# Patient Record
Sex: FEMALE | Race: WHITE | Employment: UNEMPLOYED | URBAN - METROPOLITAN AREA
[De-identification: names, ages, dates, MRNs, and addresses within clinical notes are randomized per-mention and may not be internally consistent; named-entity substitution may affect disease eponyms.]

---

## 2018-10-29 ENCOUNTER — OFFICE VISIT (OUTPATIENT)
Dept: INTERNAL MEDICINE CLINIC | Facility: CLINIC | Age: 33
End: 2018-10-29
Payer: MEDICAID

## 2018-10-29 VITALS
RESPIRATION RATE: 17 BRPM | SYSTOLIC BLOOD PRESSURE: 128 MMHG | WEIGHT: 180.81 LBS | HEIGHT: 64 IN | OXYGEN SATURATION: 98 % | BODY MASS INDEX: 30.87 KG/M2 | DIASTOLIC BLOOD PRESSURE: 84 MMHG | TEMPERATURE: 99 F | HEART RATE: 103 BPM

## 2018-10-29 DIAGNOSIS — N62 LARGE BREASTS: ICD-10-CM

## 2018-10-29 DIAGNOSIS — M79.673 PAIN OF FOOT, UNSPECIFIED LATERALITY: ICD-10-CM

## 2018-10-29 DIAGNOSIS — M25.562 PAIN IN BOTH KNEES, UNSPECIFIED CHRONICITY: ICD-10-CM

## 2018-10-29 DIAGNOSIS — J45.909 BRONCHITIS WITH ASTHMA, ACUTE: Primary | ICD-10-CM

## 2018-10-29 DIAGNOSIS — M25.561 PAIN IN BOTH KNEES, UNSPECIFIED CHRONICITY: ICD-10-CM

## 2018-10-29 DIAGNOSIS — J20.9 BRONCHITIS WITH ASTHMA, ACUTE: Primary | ICD-10-CM

## 2018-10-29 PROCEDURE — 99203 OFFICE O/P NEW LOW 30 MIN: CPT | Performed by: INTERNAL MEDICINE

## 2018-10-29 RX ORDER — ERGOCALCIFEROL 1.25 MG/1
CAPSULE ORAL
Refills: 2 | COMMUNITY
Start: 2018-10-29 | End: 2019-05-10 | Stop reason: ALTCHOICE

## 2018-10-29 RX ORDER — MULTIVITAMIN
TABLET ORAL
COMMUNITY

## 2018-10-29 RX ORDER — CODEINE PHOSPHATE AND GUAIFENESIN 10; 100 MG/5ML; MG/5ML
5 SOLUTION ORAL EVERY 6 HOURS PRN
Qty: 118 ML | Refills: 0 | Status: SHIPPED | OUTPATIENT
Start: 2018-10-29 | End: 2019-05-10 | Stop reason: ALTCHOICE

## 2018-10-29 RX ORDER — METHYLPREDNISOLONE 4 MG/1
TABLET ORAL
Qty: 1 KIT | Refills: 0 | Status: SHIPPED | OUTPATIENT
Start: 2018-10-29 | End: 2019-05-10 | Stop reason: ALTCHOICE

## 2018-10-29 RX ORDER — AZITHROMYCIN 250 MG/1
TABLET, FILM COATED ORAL
Qty: 6 TABLET | Refills: 0 | Status: SHIPPED | OUTPATIENT
Start: 2018-10-29 | End: 2019-05-10 | Stop reason: ALTCHOICE

## 2018-10-29 RX ORDER — ALBUTEROL SULFATE 90 UG/1
2 AEROSOL, METERED RESPIRATORY (INHALATION) EVERY 6 HOURS PRN
Qty: 1 INHALER | Refills: 2 | Status: SHIPPED | OUTPATIENT
Start: 2018-10-29 | End: 2021-08-15

## 2018-10-29 NOTE — PROGRESS NOTES
Donnell Grider is a 29year old female.     Chief complaint:  URTI, asthma cough  HPI:     29year old female with PMH as listed below here for   URTI and asthma   Patient reports that she went apple picking over the weekend   When she came back started having SpO2 98%   BMI 31.03 kg/m²   GENERAL: well developed, well nourished,in no apparent distress  SKIN: no rashes,no suspicious lesions  HEENT: atraumatic, normocephalic,ears and throat are clear  NECK: supple,no adenopathy  LUNGS: coarse breathing sound bilat

## 2018-12-10 ENCOUNTER — HOSPITAL ENCOUNTER (OUTPATIENT)
Dept: GENERAL RADIOLOGY | Facility: HOSPITAL | Age: 33
Discharge: HOME | End: 2018-12-10
Attending: INTERNAL MEDICINE
Payer: MEDICAID

## 2018-12-10 DIAGNOSIS — N62 LARGE BREASTS: ICD-10-CM

## 2018-12-10 DIAGNOSIS — M79.673 PAIN OF FOOT, UNSPECIFIED LATERALITY: ICD-10-CM

## 2018-12-10 DIAGNOSIS — M25.562 PAIN IN BOTH KNEES, UNSPECIFIED CHRONICITY: ICD-10-CM

## 2018-12-10 DIAGNOSIS — M25.561 PAIN IN BOTH KNEES, UNSPECIFIED CHRONICITY: ICD-10-CM

## 2018-12-10 DIAGNOSIS — M25.561 RIGHT KNEE PAIN, UNSPECIFIED CHRONICITY: ICD-10-CM

## 2018-12-10 DIAGNOSIS — M25.562 LEFT KNEE PAIN, UNSPECIFIED CHRONICITY: ICD-10-CM

## 2018-12-10 PROCEDURE — 73560 X-RAY EXAM OF KNEE 1 OR 2: CPT | Performed by: INTERNAL MEDICINE

## 2018-12-10 PROCEDURE — 72070 X-RAY EXAM THORAC SPINE 2VWS: CPT | Performed by: INTERNAL MEDICINE

## 2018-12-11 RX ORDER — BUDESONIDE AND FORMOTEROL FUMARATE DIHYDRATE 80; 4.5 UG/1; UG/1
2 AEROSOL RESPIRATORY (INHALATION) 2 TIMES DAILY
Qty: 1 INHALER | Refills: 1 | Status: SHIPPED | OUTPATIENT
Start: 2018-12-11 | End: 2019-05-10 | Stop reason: ALTCHOICE

## 2018-12-17 RX ORDER — IBUPROFEN 600 MG/1
600 TABLET ORAL EVERY 6 HOURS PRN
Qty: 90 TABLET | Refills: 1 | Status: SHIPPED | OUTPATIENT
Start: 2018-12-17 | End: 2019-01-16

## 2019-01-08 ENCOUNTER — OFFICE VISIT (OUTPATIENT)
Dept: INTERNAL MEDICINE CLINIC | Facility: CLINIC | Age: 34
End: 2019-01-08
Payer: MEDICAID

## 2019-01-08 ENCOUNTER — TELEPHONE (OUTPATIENT)
Dept: INTERNAL MEDICINE CLINIC | Facility: CLINIC | Age: 34
End: 2019-01-08

## 2019-01-08 VITALS
WEIGHT: 174.38 LBS | SYSTOLIC BLOOD PRESSURE: 110 MMHG | HEART RATE: 85 BPM | OXYGEN SATURATION: 100 % | RESPIRATION RATE: 17 BRPM | TEMPERATURE: 98 F | HEIGHT: 64 IN | DIASTOLIC BLOOD PRESSURE: 72 MMHG | BODY MASS INDEX: 29.77 KG/M2

## 2019-01-08 DIAGNOSIS — M79.673 PAIN OF FOOT, UNSPECIFIED LATERALITY: ICD-10-CM

## 2019-01-08 DIAGNOSIS — M25.562 CHRONIC PAIN OF BOTH KNEES: ICD-10-CM

## 2019-01-08 DIAGNOSIS — M25.561 CHRONIC PAIN OF BOTH KNEES: ICD-10-CM

## 2019-01-08 DIAGNOSIS — N62 LARGE BREASTS: ICD-10-CM

## 2019-01-08 DIAGNOSIS — M25.511 CHRONIC RIGHT SHOULDER PAIN: ICD-10-CM

## 2019-01-08 DIAGNOSIS — G89.29 CHRONIC RIGHT SHOULDER PAIN: ICD-10-CM

## 2019-01-08 DIAGNOSIS — Z00.00 ANNUAL PHYSICAL EXAM: Primary | ICD-10-CM

## 2019-01-08 DIAGNOSIS — G89.29 CHRONIC PAIN OF BOTH KNEES: ICD-10-CM

## 2019-01-08 LAB
ALBUMIN SERPL BCP-MCNC: 4.1 G/DL (ref 3.5–4.8)
ALBUMIN/GLOB SERPL: 1.4 {RATIO} (ref 1–2)
ALP SERPL-CCNC: 40 U/L (ref 32–100)
ALT SERPL-CCNC: 14 U/L (ref 14–54)
ANION GAP SERPL CALC-SCNC: 10 MMOL/L (ref 0–18)
AST SERPL-CCNC: 21 U/L (ref 15–41)
BASOPHILS # BLD: 0 K/UL (ref 0–0.2)
BASOPHILS NFR BLD: 1 %
BILIRUB SERPL-MCNC: 0.6 MG/DL (ref 0.3–1.2)
BUN SERPL-MCNC: 14 MG/DL (ref 8–20)
BUN/CREAT SERPL: 18.7 (ref 10–20)
CALCIUM SERPL-MCNC: 9.1 MG/DL (ref 8.5–10.5)
CHLORIDE SERPL-SCNC: 103 MMOL/L (ref 95–110)
CHOLEST SERPL-MCNC: 212 MG/DL (ref 110–200)
CO2 SERPL-SCNC: 25 MMOL/L (ref 22–32)
CREAT SERPL-MCNC: 0.75 MG/DL (ref 0.5–1.5)
EOSINOPHIL # BLD: 0.2 K/UL (ref 0–0.7)
EOSINOPHIL NFR BLD: 3 %
ERYTHROCYTE [DISTWIDTH] IN BLOOD BY AUTOMATED COUNT: 13.8 % (ref 11–15)
GLOBULIN PLAS-MCNC: 3 G/DL (ref 2.5–3.7)
GLUCOSE SERPL-MCNC: 70 MG/DL (ref 70–99)
HCT VFR BLD AUTO: 42.6 % (ref 35–48)
HDLC SERPL-MCNC: 63 MG/DL
HGB BLD-MCNC: 14.1 G/DL (ref 12–16)
LDLC SERPL CALC-MCNC: 126 MG/DL (ref 0–99)
LYMPHOCYTES # BLD: 2.6 K/UL (ref 1–4)
LYMPHOCYTES NFR BLD: 41 %
MCH RBC QN AUTO: 29.7 PG (ref 27–32)
MCHC RBC AUTO-ENTMCNC: 33.1 G/DL (ref 32–37)
MCV RBC AUTO: 89.5 FL (ref 80–100)
MONOCYTES # BLD: 0.3 K/UL (ref 0–1)
MONOCYTES NFR BLD: 5 %
NEUTROPHILS # BLD AUTO: 3.1 K/UL (ref 1.8–7.7)
NEUTROPHILS NFR BLD: 50 %
NONHDLC SERPL-MCNC: 149 MG/DL
OSMOLALITY UR CALC.SUM OF ELEC: 285 MOSM/KG (ref 275–295)
PATIENT FASTING: YES
PLATELET # BLD AUTO: 267 K/UL (ref 140–400)
PMV BLD AUTO: 9.6 FL (ref 7.4–10.3)
POTASSIUM SERPL-SCNC: 3.7 MMOL/L (ref 3.3–5.1)
PROT SERPL-MCNC: 7.1 G/DL (ref 5.9–8.4)
RBC # BLD AUTO: 4.75 M/UL (ref 3.7–5.4)
SODIUM SERPL-SCNC: 138 MMOL/L (ref 136–144)
TRIGL SERPL-MCNC: 115 MG/DL (ref 1–149)
TSH SERPL-ACNC: 1.57 UIU/ML (ref 0.45–5.33)
WBC # BLD AUTO: 6.2 K/UL (ref 4–11)

## 2019-01-08 PROCEDURE — 85025 COMPLETE CBC W/AUTO DIFF WBC: CPT | Performed by: INTERNAL MEDICINE

## 2019-01-08 PROCEDURE — 80061 LIPID PANEL: CPT | Performed by: INTERNAL MEDICINE

## 2019-01-08 PROCEDURE — 82306 VITAMIN D 25 HYDROXY: CPT | Performed by: INTERNAL MEDICINE

## 2019-01-08 PROCEDURE — 80053 COMPREHEN METABOLIC PANEL: CPT | Performed by: INTERNAL MEDICINE

## 2019-01-08 PROCEDURE — 36415 COLL VENOUS BLD VENIPUNCTURE: CPT | Performed by: INTERNAL MEDICINE

## 2019-01-08 PROCEDURE — 99395 PREV VISIT EST AGE 18-39: CPT | Performed by: INTERNAL MEDICINE

## 2019-01-08 PROCEDURE — 84443 ASSAY THYROID STIM HORMONE: CPT | Performed by: INTERNAL MEDICINE

## 2019-01-08 RX ORDER — BUDESONIDE AND FORMOTEROL FUMARATE DIHYDRATE 160; 4.5 UG/1; UG/1
2 AEROSOL RESPIRATORY (INHALATION) 2 TIMES DAILY
Qty: 2 INHALER | Refills: 2 | Status: SHIPPED | OUTPATIENT
Start: 2019-01-08 | End: 2019-05-10 | Stop reason: ALTCHOICE

## 2019-01-08 RX ORDER — LORATADINE 10 MG/1
10 TABLET ORAL DAILY
Qty: 30 TABLET | Refills: 0 | Status: SHIPPED | OUTPATIENT
Start: 2019-01-08 | End: 2019-02-07

## 2019-01-08 NOTE — TELEPHONE ENCOUNTER
Sent Medical records request to Dr.Dr. Dalton Ramsey  [address] [address] [address], [address] [place] [address] Road  Office: [phone]  Fax: [phone]

## 2019-01-08 NOTE — PROGRESS NOTES
Pt presented to clinic today for blood draw. Per physician able to draw orders. Orders  documented within chart. Pt tolerated lab draw well.  verified.   Orders drawn include: cbc, cmp, lipid, tsh, vit d   Site of draw: rt [facility] [place], [facility]

## 2019-01-08 NOTE — PROGRESS NOTES
Jose De Jesus Citizen is a 29year old female.     Chief complaint:annua physical exam    HPI:       29year old female with PMH as listed below here for   Annual physical exam   Patient reports still with knee pain   On and off   Had x rays negative   Gives away at Cancer Maternal Grandfather    • Diabetes Paternal Grandfather      There is no problem list on file for this patient. REVIEW OF SYSTEMS:   A comprehensive 10 point review of systems was completed.   Pertinent positives and negatives noted in the the H DIFFERENTIAL WITH PLATELET  - LIPID PANEL  - COMP METABOLIC PANEL (14)  - TSH W REFLEX TO FREE T4  - VITAMIN D, 25-HYDROXY  - CBC W/ DIFFERENTIAL    3.  Chronic right shoulder pain  Discussed likely tendinitis   Advised to take ibuprofen tid x 5 days   PT

## 2019-01-09 LAB — 25(OH)D3 SERPL-MCNC: 33 NG/ML (ref 30–100)

## 2019-02-12 DIAGNOSIS — E61.1 LOW IRON: ICD-10-CM

## 2019-02-12 DIAGNOSIS — E53.8 LOW VITAMIN B12 LEVEL: Primary | ICD-10-CM

## 2019-02-12 DIAGNOSIS — E34.9 INCREASED PTH LEVEL: ICD-10-CM

## 2019-03-25 ENCOUNTER — TELEPHONE (OUTPATIENT)
Dept: INTERNAL MEDICINE CLINIC | Facility: CLINIC | Age: 34
End: 2019-03-25

## 2019-03-25 ENCOUNTER — APPOINTMENT (OUTPATIENT)
Dept: LAB | Facility: HOSPITAL | Age: 34
End: 2019-03-25
Attending: INTERNAL MEDICINE
Payer: MEDICAID

## 2019-03-25 DIAGNOSIS — E61.1 LOW IRON: ICD-10-CM

## 2019-03-25 DIAGNOSIS — E53.8 LOW VITAMIN B12 LEVEL: ICD-10-CM

## 2019-03-25 DIAGNOSIS — E34.9 INCREASED PTH LEVEL: ICD-10-CM

## 2019-03-25 LAB
DEPRECATED HBV CORE AB SER IA-ACNC: 28 NG/ML (ref 12–160)
IRON SATURATION: 40 % (ref 15–50)
IRON SERPL-MCNC: 134 UG/DL (ref 50–170)
PTH-INTACT SERPL-MCNC: 90.2 PG/ML (ref 18.5–88)
TOTAL IRON BINDING CAPACITY: 337 UG/DL (ref 240–450)
TRANSFERRIN SERPL-MCNC: 226 MG/DL (ref 200–360)
VIT B12 SERPL-MCNC: 441 PG/ML (ref 193–986)

## 2019-03-25 PROCEDURE — 36415 COLL VENOUS BLD VENIPUNCTURE: CPT

## 2019-03-25 PROCEDURE — 84466 ASSAY OF TRANSFERRIN: CPT

## 2019-03-25 PROCEDURE — 82728 ASSAY OF FERRITIN: CPT

## 2019-03-25 PROCEDURE — 83970 ASSAY OF PARATHORMONE: CPT

## 2019-03-25 PROCEDURE — 83540 ASSAY OF IRON: CPT

## 2019-03-25 PROCEDURE — 82607 VITAMIN B-12: CPT

## 2019-03-26 NOTE — TELEPHONE ENCOUNTER
Patient was informed that her blood tests were all normal, although her parathyroid hormone was borderline. She is advised to follow-up with Dr. Saeed Bullard.   Advised to continue her vitamin D.

## 2019-04-01 DIAGNOSIS — R79.89 HIGH SERUM PARATHYROID HORMONE (PTH): Primary | ICD-10-CM

## 2019-04-02 ENCOUNTER — TELEPHONE (OUTPATIENT)
Dept: INTERNAL MEDICINE CLINIC | Facility: CLINIC | Age: 34
End: 2019-04-02

## 2019-04-02 ENCOUNTER — DOCUMENTATION ONLY (OUTPATIENT)
Dept: INTERNAL MEDICINE CLINIC | Facility: CLINIC | Age: 34
End: 2019-04-02

## 2019-04-02 NOTE — TELEPHONE ENCOUNTER
Called to remind patient to see orthopaedic and not schedule PT  Also, endocrinology referral still pending. At this time she informed me that her insurance ID number had changed. She doesn't know why, but received the information via mail.   New card to b

## 2019-04-02 NOTE — PROGRESS NOTES
Patient stated she received a letter from [facility] that she has a new ID number. New ID # [id number]. Effective April 1, 2019      Still [employer].

## 2019-05-10 ENCOUNTER — OFFICE VISIT (OUTPATIENT)
Dept: SURGERY | Facility: CLINIC | Age: 34
End: 2019-05-10
Payer: MEDICAID

## 2019-05-10 VITALS — WEIGHT: 178.63 LBS | BODY MASS INDEX: 30.5 KG/M2 | HEIGHT: 64 IN

## 2019-05-10 DIAGNOSIS — N62 MACROMASTIA: Primary | ICD-10-CM

## 2019-05-10 PROCEDURE — 99243 OFF/OP CNSLTJ NEW/EST LOW 30: CPT | Performed by: SURGERY

## 2019-05-10 RX ORDER — AZELASTINE HYDROCHLORIDE 0.5 MG/ML
SOLUTION/ DROPS OPHTHALMIC
Refills: 3 | COMMUNITY
Start: 2019-01-24 | End: 2020-09-22

## 2019-09-23 ENCOUNTER — OFFICE VISIT (OUTPATIENT)
Dept: INTERNAL MEDICINE CLINIC | Facility: CLINIC | Age: 34
End: 2019-09-23
Payer: MEDICAID

## 2019-09-23 VITALS
BODY MASS INDEX: 33.03 KG/M2 | TEMPERATURE: 99 F | RESPIRATION RATE: 18 BRPM | SYSTOLIC BLOOD PRESSURE: 102 MMHG | OXYGEN SATURATION: 99 % | HEIGHT: 64 IN | HEART RATE: 89 BPM | DIASTOLIC BLOOD PRESSURE: 86 MMHG | WEIGHT: 193.5 LBS

## 2019-09-23 DIAGNOSIS — B96.89 ACUTE BACTERIAL RHINOSINUSITIS: Primary | ICD-10-CM

## 2019-09-23 DIAGNOSIS — J01.90 ACUTE BACTERIAL RHINOSINUSITIS: Primary | ICD-10-CM

## 2019-09-23 DIAGNOSIS — J45.20 MILD INTERMITTENT ASTHMA WITHOUT COMPLICATION: ICD-10-CM

## 2019-09-23 DIAGNOSIS — M25.569 KNEE PAIN, UNSPECIFIED CHRONICITY, UNSPECIFIED LATERALITY: ICD-10-CM

## 2019-09-23 DIAGNOSIS — E21.3 HYPERPARATHYROIDISM (HCC): ICD-10-CM

## 2019-09-23 DIAGNOSIS — R53.83 OTHER FATIGUE: ICD-10-CM

## 2019-09-23 LAB
ALBUMIN SERPL-MCNC: 3.8 G/DL (ref 3.4–5)
ALBUMIN/GLOB SERPL: 0.9 {RATIO} (ref 1–2)
ALP LIVER SERPL-CCNC: 60 U/L (ref 37–98)
ALT SERPL-CCNC: 18 U/L (ref 13–56)
ANION GAP SERPL CALC-SCNC: 7 MMOL/L (ref 0–18)
AST SERPL-CCNC: 18 U/L (ref 15–37)
BILIRUB SERPL-MCNC: 0.5 MG/DL (ref 0.1–2)
BUN BLD-MCNC: 13 MG/DL (ref 7–18)
BUN/CREAT SERPL: 19.1 (ref 10–20)
CALCIUM BLD-MCNC: 9.3 MG/DL (ref 8.5–10.1)
CHLORIDE SERPL-SCNC: 108 MMOL/L (ref 98–112)
CO2 SERPL-SCNC: 23 MMOL/L (ref 21–32)
CREAT BLD-MCNC: 0.68 MG/DL (ref 0.55–1.02)
DEPRECATED HBV CORE AB SER IA-ACNC: 32.4 NG/ML (ref 12–160)
GLOBULIN PLAS-MCNC: 4.4 G/DL (ref 2.8–4.4)
GLUCOSE BLD-MCNC: 85 MG/DL (ref 70–99)
IRON SATURATION: 27 % (ref 15–50)
IRON SERPL-MCNC: 110 UG/DL (ref 50–170)
M PROTEIN MFR SERPL ELPH: 8.2 G/DL (ref 6.4–8.2)
OSMOLALITY SERPL CALC.SUM OF ELEC: 285 MOSM/KG (ref 275–295)
PATIENT FASTING: YES
POTASSIUM SERPL-SCNC: 3.7 MMOL/L (ref 3.5–5.1)
PTH-INTACT SERPL-MCNC: 99.3 PG/ML (ref 18.5–88)
SODIUM SERPL-SCNC: 138 MMOL/L (ref 136–145)
TOTAL IRON BINDING CAPACITY: 402 UG/DL (ref 240–450)
TRANSFERRIN SERPL-MCNC: 270 MG/DL (ref 200–360)

## 2019-09-23 PROCEDURE — 83970 ASSAY OF PARATHORMONE: CPT | Performed by: INTERNAL MEDICINE

## 2019-09-23 PROCEDURE — 99214 OFFICE O/P EST MOD 30 MIN: CPT | Performed by: INTERNAL MEDICINE

## 2019-09-23 PROCEDURE — 80053 COMPREHEN METABOLIC PANEL: CPT | Performed by: INTERNAL MEDICINE

## 2019-09-23 PROCEDURE — 82728 ASSAY OF FERRITIN: CPT | Performed by: INTERNAL MEDICINE

## 2019-09-23 PROCEDURE — 82330 ASSAY OF CALCIUM: CPT | Performed by: INTERNAL MEDICINE

## 2019-09-23 PROCEDURE — 84466 ASSAY OF TRANSFERRIN: CPT | Performed by: INTERNAL MEDICINE

## 2019-09-23 PROCEDURE — 82785 ASSAY OF IGE: CPT | Performed by: INTERNAL MEDICINE

## 2019-09-23 PROCEDURE — 86003 ALLG SPEC IGE CRUDE XTRC EA: CPT | Performed by: INTERNAL MEDICINE

## 2019-09-23 PROCEDURE — 83540 ASSAY OF IRON: CPT | Performed by: INTERNAL MEDICINE

## 2019-09-23 PROCEDURE — 36415 COLL VENOUS BLD VENIPUNCTURE: CPT | Performed by: INTERNAL MEDICINE

## 2019-09-23 RX ORDER — ALBUTEROL SULFATE 90 UG/1
2 AEROSOL, METERED RESPIRATORY (INHALATION) EVERY 6 HOURS PRN
Qty: 1 INHALER | Refills: 2 | Status: SHIPPED | OUTPATIENT
Start: 2019-09-23 | End: 2020-09-22

## 2019-09-23 RX ORDER — AMOXICILLIN AND CLAVULANATE POTASSIUM 875; 125 MG/1; MG/1
1 TABLET, FILM COATED ORAL 2 TIMES DAILY
Qty: 20 TABLET | Refills: 0 | Status: SHIPPED | OUTPATIENT
Start: 2019-09-23 | End: 2019-10-03

## 2019-09-23 RX ORDER — METHYLPREDNISOLONE 4 MG/1
TABLET ORAL
Qty: 1 KIT | Refills: 0 | Status: SHIPPED | OUTPATIENT
Start: 2019-09-23 | End: 2020-03-03 | Stop reason: ALTCHOICE

## 2019-09-23 RX ORDER — CETIRIZINE HYDROCHLORIDE, PSEUDOEPHEDRINE HYDROCHLORIDE 5; 120 MG/1; MG/1
1 TABLET, FILM COATED, EXTENDED RELEASE ORAL 2 TIMES DAILY
Qty: 20 TABLET | Refills: 0 | Status: SHIPPED | OUTPATIENT
Start: 2019-09-23 | End: 2019-10-03

## 2019-09-23 RX ORDER — MONTELUKAST SODIUM 10 MG/1
10 TABLET ORAL NIGHTLY
Qty: 30 TABLET | Refills: 0 | Status: SHIPPED | OUTPATIENT
Start: 2019-09-23 | End: 2019-10-25

## 2019-09-23 RX ORDER — FLUTICASONE PROPIONATE 50 MCG
2 SPRAY, SUSPENSION (ML) NASAL DAILY
Qty: 1 BOTTLE | Refills: 1 | Status: SHIPPED | OUTPATIENT
Start: 2019-09-23 | End: 2019-11-23

## 2019-09-23 NOTE — PROGRESS NOTES
Jerrell Jeffers is a 36year old female.     Chief complaint:  Here for sinus congestion and feeling tired     HPI:   Here for sinus congestion   Started 2-3 weeks ago   In the last week got worse   doesn't have known seasonal allergy   No fever or chills   + p BMI 33.21 kg/m²   GENERAL: well developed, well nourished,in no apparent distress  SKIN: no rashes,no suspicious lesions  HEENT: atraumatic, normocephalic,ears: fluid behind TM bilaterally   NECK: supple,no adenopathy  LUNGS: clear to auscultation  CARDIO: (10 mg total) by mouth nightly. Dispense: 30 tablet; Refill: 0  - methylPREDNISolone (MEDROL) 4 MG Oral Tablet Therapy Pack; As directed. Dispense: 1 kit; Refill: 0  - Cetirizine-Pseudoephedrine ER 5-120 MG Oral Tablet 12 Hr;  Take 1 tablet by mouth 2 (tw

## 2019-09-23 NOTE — PROGRESS NOTES
ALLERGY REGION 8, IRON AND TIBC, FERRITIN, CALCIUM IONIZED, PTH, and CMP labs drawn per Dr Denette Castleman orders, Pt tolerated lab draw well

## 2019-09-24 LAB
A ALTERNATA IGE QN: <0.1 KUA/L (ref ?–0.1)
A FUMIGATUS IGE QN: <0.1 KUA/L (ref ?–0.1)
AMER SYCAMORE IGE QN: <0.1 KUA/L (ref ?–0.1)
BERMUDA GRASS IGE QN: 0.59 KUA/L (ref ?–0.1)
BOXELDER IGE QN: <0.1 KUA/L (ref ?–0.1)
C HERBARUM IGE QN: <0.1 KUA/L (ref ?–0.1)
CALIF WALNUT IGE QN: <0.1 KUA/L (ref ?–0.1)
CAT DANDER IGE QN: <0.1 KUA/L (ref ?–0.1)
CMN PIGWEED IGE QN: <0.1 KUA/L (ref ?–0.1)
COMMON RAGWEED IGE QN: <0.1 KUA/L (ref ?–0.1)
COTTONWOOD IGE QN: <0.1 KUA/L (ref ?–0.1)
D FARINAE IGE QN: <0.1 KUA/L (ref ?–0.1)
D PTERONYSS IGE QN: <0.1 KUA/L (ref ?–0.1)
DOG DANDER IGE QN: <0.1 KUA/L (ref ?–0.1)
IGE SERPL-ACNC: 63.4 KU/L (ref 2–214)
M RACEMOSUS IGE QN: <0.1 KUA/L (ref ?–0.1)
MARSH ELDER IGE QN: <0.1 KUA/L (ref ?–0.1)
MOUSE EPITH IGE QN: <0.1 KUA/L (ref ?–0.1)
MT JUNIPER IGE QN: <0.1 KUA/L (ref ?–0.1)
P NOTATUM IGE QN: <0.1 KUA/L (ref ?–0.1)
PECAN/HICK TREE IGE QN: <0.1 KUA/L (ref ?–0.1)
ROACH IGE QN: <0.1 KUA/L (ref ?–0.1)
SALTWORT IGE QN: <0.1 KUA/L (ref ?–0.1)
TIMOTHY IGE QN: 1.01 KUA/L (ref ?–0.1)
WHITE ASH IGE QN: <0.1 KUA/L (ref ?–0.1)
WHITE ELM IGE QN: <0.1 KUA/L (ref ?–0.1)
WHITE MULBERRY IGE QN: <0.1 KUA/L (ref ?–0.1)
WHITE OAK IGE QN: <0.1 KUA/L (ref ?–0.1)

## 2019-09-25 LAB
CALCIUM IONIZED PH 7.4: 1.26 MMOL/L
CALCIUM IONIZED, SERUM: 1.37 MMOL/L

## 2019-10-25 DIAGNOSIS — E21.3 HYPERPARATHYROIDISM (HCC): Primary | ICD-10-CM

## 2019-10-25 DIAGNOSIS — B96.89 ACUTE BACTERIAL RHINOSINUSITIS: ICD-10-CM

## 2019-10-25 DIAGNOSIS — J45.20 MILD INTERMITTENT ASTHMA WITHOUT COMPLICATION: ICD-10-CM

## 2019-10-25 DIAGNOSIS — M17.10 OSTEOARTHRITIS OF KNEE, UNSPECIFIED LATERALITY, UNSPECIFIED OSTEOARTHRITIS TYPE: ICD-10-CM

## 2019-10-25 DIAGNOSIS — J01.90 ACUTE BACTERIAL RHINOSINUSITIS: ICD-10-CM

## 2019-10-25 DIAGNOSIS — E21.3 HYPERPARATHYROIDISM (HCC): ICD-10-CM

## 2019-10-25 DIAGNOSIS — R53.83 OTHER FATIGUE: ICD-10-CM

## 2019-10-25 DIAGNOSIS — M25.569 KNEE PAIN, UNSPECIFIED CHRONICITY, UNSPECIFIED LATERALITY: ICD-10-CM

## 2019-10-25 RX ORDER — MONTELUKAST SODIUM 10 MG/1
TABLET ORAL
Qty: 30 TABLET | Refills: 0 | Status: SHIPPED | OUTPATIENT
Start: 2019-10-25 | End: 2019-11-23

## 2019-11-16 ENCOUNTER — HOSPITAL ENCOUNTER (EMERGENCY)
Facility: HOSPITAL | Age: 34
Discharge: HOME | End: 2019-11-16
Attending: EMERGENCY MEDICINE
Payer: MEDICAID

## 2019-11-16 ENCOUNTER — APPOINTMENT (OUTPATIENT)
Dept: CT IMAGING | Facility: HOSPITAL | Age: 34
End: 2019-11-16
Attending: EMERGENCY MEDICINE
Payer: MEDICAID

## 2019-11-16 VITALS
RESPIRATION RATE: 24 BRPM | HEART RATE: 105 BPM | OXYGEN SATURATION: 97 % | WEIGHT: 194 LBS | DIASTOLIC BLOOD PRESSURE: 77 MMHG | SYSTOLIC BLOOD PRESSURE: 109 MMHG | BODY MASS INDEX: 33 KG/M2 | TEMPERATURE: 98 F

## 2019-11-16 DIAGNOSIS — R06.00 DYSPNEA, UNSPECIFIED TYPE: Primary | ICD-10-CM

## 2019-11-16 DIAGNOSIS — R00.0 TACHYCARDIA: ICD-10-CM

## 2019-11-16 PROCEDURE — 93010 ELECTROCARDIOGRAM REPORT: CPT | Performed by: EMERGENCY MEDICINE

## 2019-11-16 PROCEDURE — 84443 ASSAY THYROID STIM HORMONE: CPT | Performed by: EMERGENCY MEDICINE

## 2019-11-16 PROCEDURE — 71260 CT THORAX DX C+: CPT | Performed by: EMERGENCY MEDICINE

## 2019-11-16 PROCEDURE — 99285 EMERGENCY DEPT VISIT HI MDM: CPT

## 2019-11-16 PROCEDURE — 85379 FIBRIN DEGRADATION QUANT: CPT | Performed by: EMERGENCY MEDICINE

## 2019-11-16 PROCEDURE — 85025 COMPLETE CBC W/AUTO DIFF WBC: CPT | Performed by: EMERGENCY MEDICINE

## 2019-11-16 PROCEDURE — 80048 BASIC METABOLIC PNL TOTAL CA: CPT | Performed by: EMERGENCY MEDICINE

## 2019-11-16 PROCEDURE — 96361 HYDRATE IV INFUSION ADD-ON: CPT

## 2019-11-16 PROCEDURE — 93005 ELECTROCARDIOGRAM TRACING: CPT

## 2019-11-16 PROCEDURE — 96374 THER/PROPH/DIAG INJ IV PUSH: CPT

## 2019-11-16 RX ORDER — LORAZEPAM 2 MG/ML
0.5 INJECTION INTRAMUSCULAR ONCE
Status: COMPLETED | OUTPATIENT
Start: 2019-11-16 | End: 2019-11-16

## 2019-11-17 NOTE — ED INITIAL ASSESSMENT (HPI)
Pt with hx of asthma c/o MARIANA since 0700 today. Has been taking inhaler with relief, but states that the MARIANA comes back.

## 2019-11-17 NOTE — ED NOTES
Patient received in sign out. Labs and imaging reviewed. D-dimer slightly elevated. CT read by vision:   CTA CHEST PE    Technical quality: Adequate to the segmental level    IMPRESSION:  - No central or segmental pulmonary embolism.   - No acute aortic abn

## 2019-11-17 NOTE — ED PROVIDER NOTES
Patient Seen in: [facility] Emergency Department    History   Patient presents with:  Dyspnea MARIANA SOB (respiratory)    Stated Complaint:     HPI    Patient complains of shortness of breath that began this morning, complains of feeling hot, tired. stated complaint:   Other systems are as noted in HPI. Constitutional and vital signs reviewed. All other systems reviewed and negative except as noted above. PSFH elements reviewed from today and agreed except as otherwise stated in HPI.     Physi DIFFERENTIAL[[id number]]          Abnormal            Final result                 Please view results for these tests on the individual orders.    RAINBOW DRAW BLUE   RAINBOW DRAW LAVENDER   RAINBOW DRAW LIGHT GREEN   RAINBOW DRAW GOLD     EKG    Rate, inte

## 2019-11-18 ENCOUNTER — TELEPHONE (OUTPATIENT)
Dept: INTERNAL MEDICINE CLINIC | Facility: CLINIC | Age: 34
End: 2019-11-18

## 2019-11-23 DIAGNOSIS — M25.569 KNEE PAIN, UNSPECIFIED CHRONICITY, UNSPECIFIED LATERALITY: ICD-10-CM

## 2019-11-23 DIAGNOSIS — R53.83 OTHER FATIGUE: ICD-10-CM

## 2019-11-23 DIAGNOSIS — J01.90 ACUTE BACTERIAL RHINOSINUSITIS: ICD-10-CM

## 2019-11-23 DIAGNOSIS — B96.89 ACUTE BACTERIAL RHINOSINUSITIS: ICD-10-CM

## 2019-11-23 DIAGNOSIS — E21.3 HYPERPARATHYROIDISM (HCC): ICD-10-CM

## 2019-11-23 DIAGNOSIS — J45.20 MILD INTERMITTENT ASTHMA WITHOUT COMPLICATION: ICD-10-CM

## 2019-11-25 RX ORDER — MONTELUKAST SODIUM 10 MG/1
TABLET ORAL
Qty: 30 TABLET | Refills: 0 | Status: SHIPPED | OUTPATIENT
Start: 2019-11-25 | End: 2019-12-20

## 2019-11-25 RX ORDER — FLUTICASONE PROPIONATE 50 MCG
SPRAY, SUSPENSION (ML) NASAL
Qty: 16 G | Refills: 0 | Status: SHIPPED | OUTPATIENT
Start: 2019-11-25 | End: 2019-12-20

## 2019-12-02 ENCOUNTER — OFFICE VISIT (OUTPATIENT)
Dept: INTERNAL MEDICINE CLINIC | Facility: CLINIC | Age: 34
End: 2019-12-02
Payer: MEDICAID

## 2019-12-02 VITALS
BODY MASS INDEX: 34.28 KG/M2 | WEIGHT: 200.81 LBS | HEIGHT: 64.17 IN | RESPIRATION RATE: 17 BRPM | SYSTOLIC BLOOD PRESSURE: 118 MMHG | HEART RATE: 79 BPM | OXYGEN SATURATION: 99 % | TEMPERATURE: 99 F | DIASTOLIC BLOOD PRESSURE: 72 MMHG

## 2019-12-02 DIAGNOSIS — J45.20 MILD INTERMITTENT ASTHMA WITHOUT COMPLICATION: Primary | ICD-10-CM

## 2019-12-02 DIAGNOSIS — E21.3 HYPERPARATHYROIDISM (HCC): ICD-10-CM

## 2019-12-02 PROCEDURE — 90471 IMMUNIZATION ADMIN: CPT | Performed by: INTERNAL MEDICINE

## 2019-12-02 PROCEDURE — 99212 OFFICE O/P EST SF 10 MIN: CPT | Performed by: INTERNAL MEDICINE

## 2019-12-02 PROCEDURE — 90686 IIV4 VACC NO PRSV 0.5 ML IM: CPT | Performed by: INTERNAL MEDICINE

## 2019-12-02 RX ORDER — BUDESONIDE AND FORMOTEROL FUMARATE DIHYDRATE 80; 4.5 UG/1; UG/1
2 AEROSOL RESPIRATORY (INHALATION) 2 TIMES DAILY
Qty: 1 INHALER | Refills: 1 | Status: SHIPPED | OUTPATIENT
Start: 2019-12-02 | End: 2020-03-03

## 2019-12-02 NOTE — PROGRESS NOTES
Dane Heart is a 36year old female.     Chief complaint: ER follow up     HPI:   36year old female with PMH as listed below here for   ER follow up   [facility] due to sob   Given breathing treatment and CT pe was negative   Patient reports f Alcohol use: No      Frequency: Never    Drug use: No       Family History   Problem Relation Age of Onset   • Cancer Mother    • Other (lymph nodes) Mother         Cancer   • Cancer Maternal Grandfather    • Diabetes Paternal Grandfather      Patient Martin

## 2019-12-20 DIAGNOSIS — E21.3 HYPERPARATHYROIDISM (HCC): ICD-10-CM

## 2019-12-20 DIAGNOSIS — M25.569 KNEE PAIN, UNSPECIFIED CHRONICITY, UNSPECIFIED LATERALITY: ICD-10-CM

## 2019-12-20 DIAGNOSIS — J01.90 ACUTE BACTERIAL RHINOSINUSITIS: ICD-10-CM

## 2019-12-20 DIAGNOSIS — J45.20 MILD INTERMITTENT ASTHMA WITHOUT COMPLICATION: ICD-10-CM

## 2019-12-20 DIAGNOSIS — R53.83 OTHER FATIGUE: ICD-10-CM

## 2019-12-20 DIAGNOSIS — B96.89 ACUTE BACTERIAL RHINOSINUSITIS: ICD-10-CM

## 2019-12-20 RX ORDER — FLUTICASONE PROPIONATE 50 MCG
SPRAY, SUSPENSION (ML) NASAL
Qty: 16 G | Refills: 0 | Status: SHIPPED | OUTPATIENT
Start: 2019-12-20 | End: 2020-09-22

## 2019-12-20 RX ORDER — MONTELUKAST SODIUM 10 MG/1
TABLET ORAL
Qty: 30 TABLET | Refills: 0 | Status: SHIPPED | OUTPATIENT
Start: 2019-12-20 | End: 2020-09-22

## 2019-12-30 ENCOUNTER — OFFICE VISIT (OUTPATIENT)
Dept: ENDOCRINOLOGY CLINIC | Facility: CLINIC | Age: 34
End: 2019-12-30
Payer: MEDICAID

## 2019-12-30 ENCOUNTER — APPOINTMENT (OUTPATIENT)
Dept: LAB | Facility: REFERENCE LAB | Age: 34
End: 2019-12-30
Attending: INTERNAL MEDICINE
Payer: MEDICAID

## 2019-12-30 ENCOUNTER — TELEPHONE (OUTPATIENT)
Dept: ENDOCRINOLOGY CLINIC | Facility: CLINIC | Age: 34
End: 2019-12-30

## 2019-12-30 VITALS
HEART RATE: 90 BPM | WEIGHT: 199.19 LBS | BODY MASS INDEX: 34 KG/M2 | SYSTOLIC BLOOD PRESSURE: 111 MMHG | DIASTOLIC BLOOD PRESSURE: 79 MMHG

## 2019-12-30 DIAGNOSIS — E21.3 HYPERPARATHYROIDISM (HCC): ICD-10-CM

## 2019-12-30 DIAGNOSIS — E55.9 VITAMIN D DEFICIENCY: Primary | ICD-10-CM

## 2019-12-30 DIAGNOSIS — E55.9 VITAMIN D DEFICIENCY: ICD-10-CM

## 2019-12-30 DIAGNOSIS — E21.3 HYPERPARATHYROIDISM (HCC): Primary | ICD-10-CM

## 2019-12-30 LAB
25(OH)D3 SERPL-MCNC: 16.3 NG/ML (ref 30–100)
CALCIUM BLD-MCNC: 9.3 MG/DL (ref 8.5–10.1)

## 2019-12-30 PROCEDURE — 99203 OFFICE O/P NEW LOW 30 MIN: CPT | Performed by: INTERNAL MEDICINE

## 2019-12-30 PROCEDURE — 36415 COLL VENOUS BLD VENIPUNCTURE: CPT

## 2019-12-30 PROCEDURE — 82306 VITAMIN D 25 HYDROXY: CPT

## 2019-12-30 PROCEDURE — 82310 ASSAY OF CALCIUM: CPT

## 2019-12-30 NOTE — H&P
New Patient Evaluation - History and Physical    CONSULT - Reason for Visit: Elevated PTH levels  Requesting Physician: Dr. Yudelka Adorno:  Patient presents with:  Consult: elevated TSH levels, referred by PCP       HISTORY OF PRESENT ILLNESS capsule (50,000 Units total) by mouth once a week. Take 1 capsule (50,000 units total) by mouth once a week for 10 weeks, then once a month. 12 capsule 0   • Phenylephrine-DM-GG (TUSSIN CF COUGH & COLD OR) Take by mouth.      • Multiple Vitamin (MULTI-VITAM and appears stated age  PSYCH: normal affect  EYES:  No proptosis, no ptosis, conjunctiva normal  ENT:  Normocephalic, atraumatic  NECK:  Supple, symmetrical, trachea midline, no adenopathy, thyroid symmetric, not enlarged and no tenderness  HEMATOLOGIC/LY

## 2019-12-31 RX ORDER — ERGOCALCIFEROL (VITAMIN D2) 1250 MCG
50000 CAPSULE ORAL WEEKLY
Qty: 12 CAPSULE | Refills: 0 | Status: SHIPPED | OUTPATIENT
Start: 2019-12-31 | End: 2020-03-30

## 2019-12-31 NOTE — TELEPHONE ENCOUNTER
Patient has low Vit D at 16  This could cause PTH elevation  Please start ergocalciferol 50,000 units q week x 10 weeks followed by once a month  Repeat PTH, calcium and Vitamin D in 9-10 weeks    Reyes review symptoms of hypercalcemia, to call if she expe

## 2019-12-31 NOTE — TELEPHONE ENCOUNTER
Spoke with pt with AM note below. Pt verbalizes understanding and will start ergocalciferol medication as prescribed. Reviewed symptoms of hypercalcemia with pt and pt verbalized understanding. Rx sent to pharmacy and labs ordered.

## 2020-01-06 PROBLEM — R79.89 HIGH SERUM PARATHYROID HORMONE (PTH): Status: ACTIVE | Noted: 2020-01-06

## 2020-02-04 ENCOUNTER — OFFICE VISIT (OUTPATIENT)
Dept: INTERNAL MEDICINE CLINIC | Facility: CLINIC | Age: 35
End: 2020-02-04
Payer: MEDICAID

## 2020-02-04 VITALS
BODY MASS INDEX: 33.46 KG/M2 | TEMPERATURE: 98 F | WEIGHT: 196 LBS | SYSTOLIC BLOOD PRESSURE: 114 MMHG | OXYGEN SATURATION: 98 % | HEIGHT: 64.17 IN | DIASTOLIC BLOOD PRESSURE: 72 MMHG | HEART RATE: 81 BPM | RESPIRATION RATE: 17 BRPM

## 2020-02-04 DIAGNOSIS — J11.1 INFLUENZA: ICD-10-CM

## 2020-02-04 DIAGNOSIS — J45.901 EXACERBATION OF ASTHMA, UNSPECIFIED ASTHMA SEVERITY, UNSPECIFIED WHETHER PERSISTENT: Primary | ICD-10-CM

## 2020-02-04 DIAGNOSIS — B37.0 ORAL THRUSH: ICD-10-CM

## 2020-02-04 DIAGNOSIS — R68.89 FLU-LIKE SYMPTOMS: ICD-10-CM

## 2020-02-04 PROCEDURE — 99214 OFFICE O/P EST MOD 30 MIN: CPT | Performed by: INTERNAL MEDICINE

## 2020-02-04 PROCEDURE — 87631 RESP VIRUS 3-5 TARGETS: CPT | Performed by: INTERNAL MEDICINE

## 2020-02-04 RX ORDER — PROMETHAZINE HYDROCHLORIDE AND CODEINE PHOSPHATE 6.25; 1 MG/5ML; MG/5ML
2.5 SYRUP ORAL EVERY 4 HOURS PRN
Qty: 118 ML | Refills: 0 | Status: SHIPPED | OUTPATIENT
Start: 2020-02-04 | End: 2020-09-22

## 2020-02-04 RX ORDER — METHYLPREDNISOLONE 4 MG/1
TABLET ORAL
Qty: 1 KIT | Refills: 0 | Status: SHIPPED | OUTPATIENT
Start: 2020-02-04 | End: 2020-03-03 | Stop reason: ALTCHOICE

## 2020-02-04 RX ORDER — IPRATROPIUM BROMIDE AND ALBUTEROL SULFATE 2.5; .5 MG/3ML; MG/3ML
3 SOLUTION RESPIRATORY (INHALATION) ONCE
Status: DISCONTINUED | OUTPATIENT
Start: 2020-02-04 | End: 2020-02-05

## 2020-02-04 RX ORDER — BUDESONIDE AND FORMOTEROL FUMARATE DIHYDRATE 160; 4.5 UG/1; UG/1
2 AEROSOL RESPIRATORY (INHALATION) 2 TIMES DAILY
Qty: 1 INHALER | Refills: 0 | Status: SHIPPED | OUTPATIENT
Start: 2020-02-04 | End: 2021-08-15

## 2020-02-04 RX ORDER — AZITHROMYCIN 250 MG/1
TABLET, FILM COATED ORAL
Qty: 6 TABLET | Refills: 0 | Status: SHIPPED | OUTPATIENT
Start: 2020-02-04 | End: 2020-03-03

## 2020-02-04 NOTE — PROGRESS NOTES
Elisa Eaton is a 30year old female.     Chief complaint: ***    HPI:   30year old female with PMH body aches   Sneezing   No fever or chills   No ear coughing   Productive clear   Wheezing   Sob                 Current Outpatient Medications   Medication Smokeless tobacco: Never Used    Alcohol use: No      Frequency: Never    Drug use: No       Family History   Problem Relation Age of Onset   • Cancer Mother    • Other (lymph nodes) Mother         Cancer   • Cancer Maternal Grandfather    • Diabetes Pater

## 2020-02-05 LAB
FLUAV + FLUBV RNA SPEC NAA+PROBE: NEGATIVE
FLUAV + FLUBV RNA SPEC NAA+PROBE: NEGATIVE
FLUAV + FLUBV RNA SPEC NAA+PROBE: POSITIVE

## 2020-02-05 RX ORDER — OSELTAMIVIR PHOSPHATE 75 MG/1
75 CAPSULE ORAL 2 TIMES DAILY
Qty: 10 CAPSULE | Refills: 0 | Status: SHIPPED | OUTPATIENT
Start: 2020-02-05 | End: 2020-02-10

## 2020-02-05 NOTE — PROGRESS NOTES
Clint Peterson is a 30year old female.     Chief complaint: cough and asthma exacerbation       HPI:   30year old female with PMH of asthma   Here for URTI   Started 3 days ago   body aches   Sneezing   Some congestion   Now went down to her chest   Coughing Oral Tab Take by mouth. Past Medical History:   Diagnosis Date   • Asthma      History reviewed. No pertinent surgical history.      Social History:  Social History    Tobacco Use      Smoking status: Never Smoker      Smokeless tobacco: Never Used to restart Singulair   · Promethazine with codeine as needed discussed might cause drowsiness   · If she continues to have symptoms to have CXR   · Nystatin mouth wash for oral thrush and advised to gargle with water after using steroid inhaler   · Check H

## 2020-02-24 ENCOUNTER — NURSE ONLY (OUTPATIENT)
Dept: INTERNAL MEDICINE CLINIC | Facility: CLINIC | Age: 35
End: 2020-02-24
Payer: MEDICAID

## 2020-02-24 DIAGNOSIS — J02.9 SORE THROAT: Primary | ICD-10-CM

## 2020-02-24 DIAGNOSIS — J02.9 SORE THROAT: ICD-10-CM

## 2020-02-24 LAB
CONTROL LINE PRESENT WITH A CLEAR BACKGROUND (YES/NO): YES YES/NO
KIT LOT #: NORMAL NUMERIC
STREP GRP A CUL-SCR: NEGATIVE

## 2020-02-24 PROCEDURE — 87081 CULTURE SCREEN ONLY: CPT | Performed by: INTERNAL MEDICINE

## 2020-02-24 PROCEDURE — 87880 STREP A ASSAY W/OPTIC: CPT | Performed by: INTERNAL MEDICINE

## 2020-02-24 RX ORDER — LIDOCAINE HYDROCHLORIDE 20 MG/ML
5 SOLUTION OROPHARYNGEAL
Qty: 1 BOTTLE | Refills: 0 | Status: SHIPPED | OUTPATIENT
Start: 2020-02-24 | End: 2020-03-03 | Stop reason: ALTCHOICE

## 2020-03-03 ENCOUNTER — OFFICE VISIT (OUTPATIENT)
Dept: INTERNAL MEDICINE CLINIC | Facility: CLINIC | Age: 35
End: 2020-03-03
Payer: MEDICAID

## 2020-03-03 VITALS
HEIGHT: 64.17 IN | DIASTOLIC BLOOD PRESSURE: 80 MMHG | BODY MASS INDEX: 34.18 KG/M2 | OXYGEN SATURATION: 99 % | SYSTOLIC BLOOD PRESSURE: 121 MMHG | WEIGHT: 200.19 LBS | HEART RATE: 80 BPM

## 2020-03-03 DIAGNOSIS — E78.5 HYPERLIPIDEMIA, UNSPECIFIED HYPERLIPIDEMIA TYPE: ICD-10-CM

## 2020-03-03 DIAGNOSIS — J45.20 MILD INTERMITTENT ASTHMA WITHOUT COMPLICATION: ICD-10-CM

## 2020-03-03 DIAGNOSIS — Z51.81 THERAPEUTIC DRUG MONITORING: ICD-10-CM

## 2020-03-03 DIAGNOSIS — R63.5 ABNORMAL WEIGHT GAIN: Primary | ICD-10-CM

## 2020-03-03 LAB
EST. AVERAGE GLUCOSE BLD GHB EST-MCNC: 100 MG/DL (ref 68–126)
HBA1C MFR BLD HPLC: 5.1 % (ref ?–5.7)

## 2020-03-03 PROCEDURE — 93000 ELECTROCARDIOGRAM COMPLETE: CPT | Performed by: INTERNAL MEDICINE

## 2020-03-03 PROCEDURE — 99215 OFFICE O/P EST HI 40 MIN: CPT | Performed by: INTERNAL MEDICINE

## 2020-03-03 PROCEDURE — 83036 HEMOGLOBIN GLYCOSYLATED A1C: CPT | Performed by: INTERNAL MEDICINE

## 2020-03-03 RX ORDER — PHENTERMINE HYDROCHLORIDE 15 MG/1
15 CAPSULE ORAL EVERY MORNING
Qty: 30 CAPSULE | Refills: 0 | Status: SHIPPED | OUTPATIENT
Start: 2020-03-03 | End: 2020-09-22

## 2020-03-03 RX ORDER — CROMOLYN SODIUM 40 MG/ML
SOLUTION/ DROPS OPHTHALMIC
Refills: 3 | COMMUNITY
Start: 2019-11-08 | End: 2020-09-22

## 2020-03-03 RX ORDER — POLYMYXIN B SULFATE AND TRIMETHOPRIM 1; 10000 MG/ML; [USP'U]/ML
SOLUTION OPHTHALMIC
COMMUNITY
Start: 2020-02-29 | End: 2020-09-22

## 2020-03-03 RX ORDER — ERYTHROMYCIN 5 MG/G
OINTMENT OPHTHALMIC
COMMUNITY
Start: 2020-03-02 | End: 2020-09-22

## 2020-03-03 NOTE — PROGRESS NOTES
Abimbola Stanley is a 59 [address] old female. Chief complaint:  weight loss management and obesity related comorbidities    HPI:     Abimbola Stanley is a 59 [address] old female new to our office today.    with PMH as listed below here for weight management     Weight histo into the lungs 2 (two) times daily. 1 Inhaler 0   • ergocalciferol 1.25 MG (57038 UT) Oral Cap Take 1 capsule (50,000 Units total) by mouth once a week. Take 1 capsule (50,000 units total) by mouth once a week for 10 weeks, then once a month.  12 capsule 0 Mother    • Other (lymph nodes) Mother         Cancer   • Cancer Maternal Grandfather    • Diabetes Paternal Grandfather      Patient Active Problem List:     Macromastia     High serum parathyroid hormone (PTH)              REVIEW OF SYSTEMS:   A comprehe management for success.      Plan :  · Advised the patient to follow low carb high protein diet   · Advised to count carbs goal carbs is 50 g per day   · Advised to increase protein goal is 90 g per day   · Can add protein shakes   · Increase water intake g

## 2020-03-03 NOTE — PATIENT INSTRUCTIONS
LESLEY    Welcome to [facility]. We are excited that you are committed to improving your health and have invited our practice to be part of your journey.  Our approach to the medical management of weight loss is similar to that of other chr water per day, add fiber ( benefiber) to the water to increase fullness, overcome constipation    · Eat slowly    · Do not drink your calories ( no regular pop, juice, high calorie coffee drinks, limit alcohol) Also stay away from artificially sweetened be calorie diet and exercise to help you lose weight. How should I use this medicine? Take this medicine by mouth with a glass of water. Follow the directions on the prescription label.  The instructions for use may differ based on the product and dose you a difficulties like pseudoephedrine or phenylephrine  · procarbazine  · sibutramine  · stimulant medicines for attention disorders, weight loss, or to stay awake  This medicine may also interact with the following medications:  · certain medicines for depres pregnant  · breast-feeding  What should I watch for while using this medicine? Notify your physician immediately if you become short of breath while doing your normal activities. Do not take this medicine within 6 hours of bedtime.  It can keep you from g

## 2020-03-24 ENCOUNTER — TELEPHONE (OUTPATIENT)
Dept: INTERNAL MEDICINE CLINIC | Facility: CLINIC | Age: 35
End: 2020-03-24

## 2020-03-27 DIAGNOSIS — E55.9 VITAMIN D DEFICIENCY: ICD-10-CM

## 2020-03-30 RX ORDER — ERGOCALCIFEROL 1.25 MG/1
50000 CAPSULE ORAL
Qty: 3 CAPSULE | Refills: 0 | Status: SHIPPED | OUTPATIENT
Start: 2020-03-30 | End: 2022-05-09

## 2020-04-01 ENCOUNTER — VIRTUAL PHONE E/M (OUTPATIENT)
Dept: INTERNAL MEDICINE CLINIC | Facility: CLINIC | Age: 35
End: 2020-04-01
Payer: MEDICAID

## 2020-04-01 DIAGNOSIS — Z51.81 THERAPEUTIC DRUG MONITORING: Primary | ICD-10-CM

## 2020-04-01 RX ORDER — PHENTERMINE HYDROCHLORIDE 15 MG/1
15 CAPSULE ORAL EVERY MORNING
Qty: 30 CAPSULE | Refills: 0 | Status: SHIPPED | OUTPATIENT
Start: 2020-04-01 | End: 2020-04-02

## 2020-04-01 NOTE — PROGRESS NOTES
Virtual/Telephone Check-In    Karolyn Jackson verbally declines a Virtual/Telephone Check-In service on 04/01/20. Patient understands and accepts financial responsibility for any deductible, co-insurance and/or co-pays associated with this service.     Patient

## 2020-04-02 RX ORDER — PHENTERMINE HYDROCHLORIDE 15 MG/1
15 CAPSULE ORAL EVERY MORNING
Qty: 30 CAPSULE | Refills: 0 | Status: SHIPPED | OUTPATIENT
Start: 2020-04-02 | End: 2020-09-22

## 2020-05-17 ENCOUNTER — HOSPITAL ENCOUNTER (EMERGENCY)
Facility: HOSPITAL | Age: 35
Discharge: HOME | End: 2020-05-17
Attending: EMERGENCY MEDICINE
Payer: MEDICAID

## 2020-05-17 ENCOUNTER — APPOINTMENT (OUTPATIENT)
Dept: ULTRASOUND IMAGING | Facility: HOSPITAL | Age: 35
End: 2020-05-17
Attending: EMERGENCY MEDICINE
Payer: MEDICAID

## 2020-05-17 VITALS
SYSTOLIC BLOOD PRESSURE: 132 MMHG | WEIGHT: 119 LBS | HEART RATE: 91 BPM | TEMPERATURE: 98 F | OXYGEN SATURATION: 99 % | HEIGHT: 61 IN | RESPIRATION RATE: 18 BRPM | BODY MASS INDEX: 22.47 KG/M2 | DIASTOLIC BLOOD PRESSURE: 80 MMHG

## 2020-05-17 DIAGNOSIS — N83.209 HEMORRHAGIC CYST OF OVARY: Primary | ICD-10-CM

## 2020-05-17 PROCEDURE — 76830 TRANSVAGINAL US NON-OB: CPT | Performed by: EMERGENCY MEDICINE

## 2020-05-17 PROCEDURE — 99284 EMERGENCY DEPT VISIT MOD MDM: CPT

## 2020-05-17 PROCEDURE — 93975 VASCULAR STUDY: CPT | Performed by: EMERGENCY MEDICINE

## 2020-05-17 PROCEDURE — 81025 URINE PREGNANCY TEST: CPT

## 2020-05-17 PROCEDURE — 76856 US EXAM PELVIC COMPLETE: CPT | Performed by: EMERGENCY MEDICINE

## 2020-05-17 PROCEDURE — 81001 URINALYSIS AUTO W/SCOPE: CPT | Performed by: EMERGENCY MEDICINE

## 2020-05-17 RX ORDER — HYDROCODONE BITARTRATE AND ACETAMINOPHEN 5; 325 MG/1; MG/1
1 TABLET ORAL EVERY 6 HOURS PRN
Qty: 8 TABLET | Refills: 0 | Status: SHIPPED | OUTPATIENT
Start: 2020-05-17 | End: 2020-09-22

## 2020-05-18 NOTE — ED NOTES
Assumed care of patient from triage. Pt c/o left sided pelvic pain that started at 0500 after having intercourse at 0430. Pain does not radiate. Intermittent nausea without emesis d/t pain. Took 600mg motrin at 1200 with relief of pain.  Pain returned aroun

## 2020-05-18 NOTE — ED PROVIDER NOTES
Patient Seen in: [facility] AND [facility] Emergency Department      History   Patient presents with:  Abdomen/Flank Pain    Stated Complaint: lower left abd pain    HPI    32-year-old-year-old female with history of labor x1 with intermittent left lower pelvic includes ovarian cyst, ovarian torsion, UTI.       ED Course     Labs Reviewed   URINALYSIS WITH CULTURE REFLEX - Abnormal; Notable for the following components:       Result Value    Clarity Urine Hazy (*)     Ketones Urine Trace (*)     Blood Urine Small Tab  Take 1 tablet by mouth every 6 (six) hours as needed.   Qty: 8 tablet Refills: 0

## 2020-07-29 DIAGNOSIS — Z12.4 SCREENING FOR CERVICAL CANCER: Primary | ICD-10-CM

## 2020-07-29 DIAGNOSIS — N62 LARGE BREASTS: ICD-10-CM

## 2020-09-21 ENCOUNTER — TELEPHONE (OUTPATIENT)
Dept: SURGERY | Facility: CLINIC | Age: 35
End: 2020-09-21

## 2020-09-21 NOTE — TELEPHONE ENCOUNTER
Returned pt's call as she left a voicemail stating she had insurance questions. Pt had questions about loosing weight, getting in sooner, how long an authorization is good for, and when she could possibly be scheduled in 2021.    I let her know that right n

## 2020-09-22 ENCOUNTER — OFFICE VISIT (OUTPATIENT)
Dept: OBGYN CLINIC | Facility: CLINIC | Age: 35
End: 2020-09-22
Payer: MEDICAID

## 2020-09-22 VITALS
DIASTOLIC BLOOD PRESSURE: 87 MMHG | SYSTOLIC BLOOD PRESSURE: 122 MMHG | WEIGHT: 200 LBS | HEART RATE: 81 BPM | BODY MASS INDEX: 35 KG/M2 | HEIGHT: 63.5 IN

## 2020-09-22 DIAGNOSIS — Z12.4 CERVICAL CANCER SCREENING: ICD-10-CM

## 2020-09-22 DIAGNOSIS — Z12.4 SCREENING FOR MALIGNANT NEOPLASM OF CERVIX: ICD-10-CM

## 2020-09-22 DIAGNOSIS — R10.2 PELVIC PAIN IN FEMALE: ICD-10-CM

## 2020-09-22 DIAGNOSIS — Z01.419 ENCOUNTER FOR GYNECOLOGICAL EXAMINATION WITHOUT ABNORMAL FINDING: Primary | ICD-10-CM

## 2020-09-22 PROCEDURE — 3074F SYST BP LT 130 MM HG: CPT | Performed by: OBSTETRICS & GYNECOLOGY

## 2020-09-22 PROCEDURE — 99385 PREV VISIT NEW AGE 18-39: CPT | Performed by: OBSTETRICS & GYNECOLOGY

## 2020-09-22 PROCEDURE — 3008F BODY MASS INDEX DOCD: CPT | Performed by: OBSTETRICS & GYNECOLOGY

## 2020-09-22 PROCEDURE — 99202 OFFICE O/P NEW SF 15 MIN: CPT | Performed by: OBSTETRICS & GYNECOLOGY

## 2020-09-22 PROCEDURE — 3079F DIAST BP 80-89 MM HG: CPT | Performed by: OBSTETRICS & GYNECOLOGY

## 2020-09-22 NOTE — PROGRESS NOTES
Well Woman Exam    HPI:  The patient is a 31yo female who presents today for annual exam. She was seen in the ER in May for LLQ pain. She states she was told she has a cyst on her ovary and was to follow up. She states she has no pain at this time.  She sta Jehovah's witness service: Not on file        Active member of club or organization: Not on file        Attends meetings of clubs or organizations: Not on file        Relationship status: Not on file      Intimate partner violence        Fear of current or ex part breast pain, lumps, or discharge.    Neurological:  denies headaches, blurred or double vision   Psychiatric: denies depression or anxiety       09/22/20  1504   BP: 122/87   Pulse: 81       PHYSICAL EXAM:   Constitutional: well developed, well nourished  H continues or other symptoms can consider  3. Reviewed Ibuprofen and Heating pad if pain returns and to call    5.  Follow up in 1 year    I spent 20 minutes of the visit reviewing US and discussing pelvic pain and more than 50% was face to face time

## 2020-09-23 LAB — HPV I/H RISK 1 DNA SPEC QL NAA+PROBE: NEGATIVE

## 2020-09-24 ENCOUNTER — PATIENT MESSAGE (OUTPATIENT)
Dept: OBGYN CLINIC | Facility: CLINIC | Age: 35
End: 2020-09-24

## 2020-10-22 DIAGNOSIS — J45.909 UNCOMPLICATED ASTHMA, UNSPECIFIED ASTHMA SEVERITY, UNSPECIFIED WHETHER PERSISTENT: ICD-10-CM

## 2020-10-22 DIAGNOSIS — R06.02 SOB (SHORTNESS OF BREATH): Primary | ICD-10-CM

## 2020-10-23 ENCOUNTER — APPOINTMENT (OUTPATIENT)
Dept: LAB | Facility: HOSPITAL | Age: 35
End: 2020-10-23
Attending: INTERNAL MEDICINE
Payer: MEDICAID

## 2020-10-23 DIAGNOSIS — R06.02 SOB (SHORTNESS OF BREATH): ICD-10-CM

## 2020-10-23 DIAGNOSIS — J45.909 UNCOMPLICATED ASTHMA, UNSPECIFIED ASTHMA SEVERITY, UNSPECIFIED WHETHER PERSISTENT: ICD-10-CM

## 2021-02-25 ENCOUNTER — TELEPHONE (OUTPATIENT)
Dept: SURGERY | Facility: CLINIC | Age: 36
End: 2021-02-25

## 2021-02-25 NOTE — TELEPHONE ENCOUNTER
Spoke to patient about her upcoming appointment in April with Dr. Adan Chew. We discussed insurance approval timelines. She was appreciative of the help and would like her appointment be moved up sooner if possible.  I informed her that I will advise with the

## 2021-03-12 ENCOUNTER — HOSPITAL ENCOUNTER (EMERGENCY)
Facility: HOSPITAL | Age: 36
Discharge: HOME | End: 2021-03-12
Attending: EMERGENCY MEDICINE
Payer: MEDICAID

## 2021-03-12 VITALS
HEART RATE: 73 BPM | HEIGHT: 64 IN | BODY MASS INDEX: 31.92 KG/M2 | DIASTOLIC BLOOD PRESSURE: 76 MMHG | SYSTOLIC BLOOD PRESSURE: 112 MMHG | TEMPERATURE: 99 F | RESPIRATION RATE: 16 BRPM | WEIGHT: 187 LBS | OXYGEN SATURATION: 99 %

## 2021-03-12 DIAGNOSIS — R06.00 DYSPNEA, UNSPECIFIED TYPE: ICD-10-CM

## 2021-03-12 DIAGNOSIS — R00.0 TACHYCARDIA: Primary | ICD-10-CM

## 2021-03-12 LAB
ANION GAP SERPL CALC-SCNC: 8 MMOL/L (ref 0–18)
B-HCG UR QL: NEGATIVE
BASOPHILS # BLD AUTO: 0.06 X10(3) UL (ref 0–0.2)
BASOPHILS NFR BLD AUTO: 0.6 %
BUN BLD-MCNC: 12 MG/DL (ref 7–18)
BUN/CREAT SERPL: 17.1 (ref 10–20)
CALCIUM BLD-MCNC: 9.2 MG/DL (ref 8.5–10.1)
CHLORIDE SERPL-SCNC: 108 MMOL/L (ref 98–112)
CO2 SERPL-SCNC: 26 MMOL/L (ref 21–32)
CREAT BLD-MCNC: 0.7 MG/DL
DEPRECATED RDW RBC AUTO: 40.2 FL (ref 35.1–46.3)
EOSINOPHIL # BLD AUTO: 0.15 X10(3) UL (ref 0–0.7)
EOSINOPHIL NFR BLD AUTO: 1.6 %
ERYTHROCYTE [DISTWIDTH] IN BLOOD BY AUTOMATED COUNT: 13.1 % (ref 11–15)
GLUCOSE BLD-MCNC: 95 MG/DL (ref 70–99)
HCT VFR BLD AUTO: 36.2 %
HGB BLD-MCNC: 12.7 G/DL
IMM GRANULOCYTES # BLD AUTO: 0.02 X10(3) UL (ref 0–1)
IMM GRANULOCYTES NFR BLD: 0.2 %
LYMPHOCYTES # BLD AUTO: 2.39 X10(3) UL (ref 1–4)
LYMPHOCYTES NFR BLD AUTO: 25.6 %
MCH RBC QN AUTO: 29.7 PG (ref 26–34)
MCHC RBC AUTO-ENTMCNC: 35.1 G/DL (ref 31–37)
MCV RBC AUTO: 84.8 FL
MONOCYTES # BLD AUTO: 0.5 X10(3) UL (ref 0.1–1)
MONOCYTES NFR BLD AUTO: 5.4 %
NEUTROPHILS # BLD AUTO: 6.22 X10 (3) UL (ref 1.5–7.7)
NEUTROPHILS # BLD AUTO: 6.22 X10(3) UL (ref 1.5–7.7)
NEUTROPHILS NFR BLD AUTO: 66.6 %
OSMOLALITY SERPL CALC.SUM OF ELEC: 294 MOSM/KG (ref 275–295)
PLATELET # BLD AUTO: 242 10(3)UL (ref 150–450)
POTASSIUM SERPL-SCNC: 2.9 MMOL/L (ref 3.5–5.1)
RBC # BLD AUTO: 4.27 X10(6)UL
SODIUM SERPL-SCNC: 142 MMOL/L (ref 136–145)
WBC # BLD AUTO: 9.3 X10(3) UL (ref 4–11)

## 2021-03-12 PROCEDURE — 93005 ELECTROCARDIOGRAM TRACING: CPT

## 2021-03-12 PROCEDURE — 36415 COLL VENOUS BLD VENIPUNCTURE: CPT

## 2021-03-12 PROCEDURE — 93010 ELECTROCARDIOGRAM REPORT: CPT | Performed by: EMERGENCY MEDICINE

## 2021-03-12 PROCEDURE — 80048 BASIC METABOLIC PNL TOTAL CA: CPT | Performed by: EMERGENCY MEDICINE

## 2021-03-12 PROCEDURE — 85025 COMPLETE CBC W/AUTO DIFF WBC: CPT | Performed by: EMERGENCY MEDICINE

## 2021-03-12 PROCEDURE — 81025 URINE PREGNANCY TEST: CPT

## 2021-03-12 PROCEDURE — 99284 EMERGENCY DEPT VISIT MOD MDM: CPT

## 2021-03-13 RX ORDER — CYCLOBENZAPRINE HCL 10 MG
10 TABLET ORAL NIGHTLY PRN
Qty: 10 TABLET | Refills: 2 | Status: SHIPPED | OUTPATIENT
Start: 2021-03-13 | End: 2021-03-23

## 2021-03-13 RX ORDER — POTASSIUM CHLORIDE 1500 MG/1
20 TABLET, FILM COATED, EXTENDED RELEASE ORAL 2 TIMES DAILY
Qty: 6 TABLET | Refills: 0 | Status: SHIPPED | OUTPATIENT
Start: 2021-03-13 | End: 2021-03-16

## 2021-03-13 NOTE — ED PROVIDER NOTES
Patient Seen in: [facility] AND [facility] Emergency Department    History   Patient presents with: Anxiety/Panic attack    Stated Complaint:     HPI    Patient complains of racing heart with tingling and spasm of fingers.   Admits to increased stress, poor slee (Temporal)   Resp 17   Ht 162.6 cm (5' 4\")   Wt 84.8 kg   SpO2 99%   BMI 32.10 kg/m²    PULSE OX The pulse oximeter revealed 98%  on room air which is not hypoxic and normal for the patient as interpreted by me.     GENERAL: awake alert no distress  HEAD: type    Disposition:  Discharge  3/12/2021 10:59 pm    Follow-up:  Jasiel Mendoza MD  [address] [facility], [address] [place] [address],[address] [address] [phone]                Medications Prescribed:  Current Discharge Medication Hiram Call

## 2021-03-13 NOTE — ED INITIAL ASSESSMENT (HPI)
Pt to ED with c/o sudden onset of palpitations and dyspnea while at home. Pt denies chest pain. Pt denies cough or fever. Pt appears anxious upon arrival. Pt states bilateral hands with numbness and tingling. Encouraged breathing techniques in triage.  Pt s

## 2021-04-09 DIAGNOSIS — Z23 NEED FOR VACCINATION: ICD-10-CM

## 2021-05-25 ENCOUNTER — OFFICE VISIT (OUTPATIENT)
Dept: SURGERY | Facility: CLINIC | Age: 36
End: 2021-05-25
Payer: MEDICAID

## 2021-05-25 DIAGNOSIS — N62 MACROMASTIA: Primary | ICD-10-CM

## 2021-05-25 PROCEDURE — 99213 OFFICE O/P EST LOW 20 MIN: CPT | Performed by: SURGERY

## 2021-05-25 NOTE — PROGRESS NOTES
Nabeel Iyer is a 29year old female who presents today for a follow-up with her . She denies fever and chills. She denies nausea, vomiting, diarrhea or constipation. She was last seen approximately 2 years ago for symptomatic macromastia.   She is activity limitation, compression, and drains. As the patient is considering having additional children, I recommended that she complete childbearing prior to proceeding with surgery.   In addition, the patient relates that she is contemplating weight loss a

## 2021-07-12 ENCOUNTER — IMMUNIZATION (OUTPATIENT)
Dept: LAB | Facility: HOSPITAL | Age: 36
End: 2021-07-12
Attending: EMERGENCY MEDICINE
Payer: MEDICAID

## 2021-07-12 DIAGNOSIS — Z23 NEED FOR VACCINATION: Primary | ICD-10-CM

## 2021-07-12 PROCEDURE — 0001A SARSCOV2 VAC 30MCG/0.3ML IM: CPT

## 2021-07-27 ENCOUNTER — TELEPHONE (OUTPATIENT)
Dept: INTERNAL MEDICINE CLINIC | Facility: CLINIC | Age: 36
End: 2021-07-27

## 2021-07-27 DIAGNOSIS — N62 LARGE BREASTS: Primary | ICD-10-CM

## 2021-07-27 NOTE — TELEPHONE ENCOUNTER
Patient called needs a referral for     Marybel Mckenzie MD    Address: [address] [place] [address]    Has appointment Thursday 10:30 am

## 2021-08-13 ENCOUNTER — IMMUNIZATION (OUTPATIENT)
Dept: LAB | Facility: HOSPITAL | Age: 36
End: 2021-08-13
Attending: EMERGENCY MEDICINE
Payer: MEDICAID

## 2021-08-13 DIAGNOSIS — Z23 NEED FOR VACCINATION: Primary | ICD-10-CM

## 2021-08-13 PROCEDURE — 0002A SARSCOV2 VAC 30MCG/0.3ML IM: CPT

## 2021-08-15 DIAGNOSIS — B37.0 ORAL THRUSH: ICD-10-CM

## 2021-08-15 DIAGNOSIS — J11.1 INFLUENZA: ICD-10-CM

## 2021-08-15 DIAGNOSIS — R68.89 FLU-LIKE SYMPTOMS: ICD-10-CM

## 2021-08-15 DIAGNOSIS — J45.901 EXACERBATION OF ASTHMA, UNSPECIFIED ASTHMA SEVERITY, UNSPECIFIED WHETHER PERSISTENT: ICD-10-CM

## 2021-08-15 RX ORDER — BUDESONIDE AND FORMOTEROL FUMARATE DIHYDRATE 160; 4.5 UG/1; UG/1
2 AEROSOL RESPIRATORY (INHALATION) 2 TIMES DAILY
Qty: 2 EACH | Refills: 2 | Status: SHIPPED | OUTPATIENT
Start: 2021-08-15

## 2021-08-15 RX ORDER — ALBUTEROL SULFATE 90 UG/1
2 AEROSOL, METERED RESPIRATORY (INHALATION) EVERY 6 HOURS PRN
Qty: 2 EACH | Refills: 2 | Status: SHIPPED | OUTPATIENT
Start: 2021-08-15

## 2021-08-25 ENCOUNTER — TELEPHONE (OUTPATIENT)
Dept: SURGERY | Facility: CLINIC | Age: 36
End: 2021-08-25

## 2021-08-25 NOTE — TELEPHONE ENCOUNTER
Spoke to patient and she provided documentation of updated 's License and Insurance card via secure department email. Chart has been updated to reflect new legal name with insurance to match.  Document was unable to print in order to scan into david

## 2021-08-26 ENCOUNTER — TELEPHONE (OUTPATIENT)
Dept: SURGERY | Facility: CLINIC | Age: 36
End: 2021-08-26

## 2021-08-26 NOTE — TELEPHONE ENCOUNTER
Received message regarding pt's authorization, per 's last appointment note pt was to reach goal weight. No request was sent from the clinicals staff to do a predetermination, pt was made aware and was scheduled for a follow up appointment.

## 2021-08-26 NOTE — TELEPHONE ENCOUNTER
Patient called and would like to know if her stuff has been submitted to insurance. I have sent a message to javier.

## 2021-09-28 ENCOUNTER — OFFICE VISIT (OUTPATIENT)
Dept: SURGERY | Facility: CLINIC | Age: 36
End: 2021-09-28
Payer: MEDICAID

## 2021-09-28 VITALS — WEIGHT: 194 LBS | BODY MASS INDEX: 33.12 KG/M2 | HEIGHT: 64 IN

## 2021-09-28 DIAGNOSIS — N62 MACROMASTIA: Primary | ICD-10-CM

## 2021-09-28 PROCEDURE — 99213 OFFICE O/P EST LOW 20 MIN: CPT | Performed by: SURGERY

## 2021-09-28 PROCEDURE — 3008F BODY MASS INDEX DOCD: CPT | Performed by: SURGERY

## 2021-09-28 NOTE — PROGRESS NOTES
Anu Sanchez is a 29year old female who presents today for a follow-up. She continues to report chronic neck and shoulder discomfort related to the size of her breast.  She currently wears a size 38G bra.   She denies any masses, skin changes, or nipple dis patient become pregnant in the future. Multiple questions were answered the patient satisfaction. She is in proceeding pending insurance approval.  In the interim, she will obtain a screening mammogram as per her primary care physician.   A total of 20 mi

## 2021-10-08 ENCOUNTER — TELEPHONE (OUTPATIENT)
Dept: SURGERY | Facility: CLINIC | Age: 36
End: 2021-10-08

## 2022-01-07 ENCOUNTER — OFFICE VISIT (OUTPATIENT)
Dept: INTERNAL MEDICINE CLINIC | Facility: CLINIC | Age: 37
End: 2022-01-07
Payer: MEDICAID

## 2022-01-07 VITALS
DIASTOLIC BLOOD PRESSURE: 82 MMHG | WEIGHT: 207.19 LBS | BODY MASS INDEX: 35.37 KG/M2 | HEIGHT: 64 IN | OXYGEN SATURATION: 98 % | SYSTOLIC BLOOD PRESSURE: 120 MMHG | HEART RATE: 85 BPM

## 2022-01-07 DIAGNOSIS — J45.909 MILD ASTHMA WITHOUT COMPLICATION, UNSPECIFIED WHETHER PERSISTENT: ICD-10-CM

## 2022-01-07 DIAGNOSIS — J30.9 ALLERGIC RHINITIS, UNSPECIFIED SEASONALITY, UNSPECIFIED TRIGGER: ICD-10-CM

## 2022-01-07 DIAGNOSIS — R39.9 URINARY SYMPTOM OR SIGN: ICD-10-CM

## 2022-01-07 DIAGNOSIS — R93.89 ENDOMETRIAL THICKENING ON ULTRASOUND: ICD-10-CM

## 2022-01-07 DIAGNOSIS — E83.52 HYPERCALCEMIA: ICD-10-CM

## 2022-01-07 DIAGNOSIS — N83.209 CYST OF OVARY, UNSPECIFIED LATERALITY: ICD-10-CM

## 2022-01-07 DIAGNOSIS — R10.9 ABDOMINAL CRAMPS: ICD-10-CM

## 2022-01-07 DIAGNOSIS — Z00.00 ANNUAL PHYSICAL EXAM: Primary | ICD-10-CM

## 2022-01-07 PROCEDURE — 90686 IIV4 VACC NO PRSV 0.5 ML IM: CPT | Performed by: INTERNAL MEDICINE

## 2022-01-07 PROCEDURE — 3008F BODY MASS INDEX DOCD: CPT | Performed by: INTERNAL MEDICINE

## 2022-01-07 PROCEDURE — 3074F SYST BP LT 130 MM HG: CPT | Performed by: INTERNAL MEDICINE

## 2022-01-07 PROCEDURE — 99395 PREV VISIT EST AGE 18-39: CPT | Performed by: INTERNAL MEDICINE

## 2022-01-07 PROCEDURE — 90471 IMMUNIZATION ADMIN: CPT | Performed by: INTERNAL MEDICINE

## 2022-01-07 PROCEDURE — 3079F DIAST BP 80-89 MM HG: CPT | Performed by: INTERNAL MEDICINE

## 2022-01-07 RX ORDER — DICYCLOMINE HYDROCHLORIDE 10 MG/1
10 CAPSULE ORAL 3 TIMES DAILY PRN
Qty: 30 CAPSULE | Refills: 3 | Status: SHIPPED | OUTPATIENT
Start: 2022-01-07 | End: 2022-01-17

## 2022-01-07 RX ORDER — CETIRIZINE HYDROCHLORIDE 10 MG/1
10 TABLET ORAL DAILY
Qty: 30 TABLET | Refills: 0 | Status: SHIPPED | OUTPATIENT
Start: 2022-01-07 | End: 2022-02-06

## 2022-01-07 NOTE — PROGRESS NOTES
Keon Amaral is a 29year old female.     Chief complaint: annual physical exam       HPI:     Keon Amaral is a 29year old pleasant female who presents for annual physical exam    Multivitamin and vitamin c       Sinus congestion and headache   Using flonas and negatives noted in the the HPI            EXAM:   /82   Pulse 85   Ht 5' 4\" (1.626 m)   Wt 207 lb 3.2 oz (94 kg)   LMP 12/31/2021 (Approximate)   SpO2 98%   BMI 35.57 kg/m²   GENERAL: well developed, well nourished,in no apparent distress  SKIN: trigger  Advised to take zyrtec and flonase daily   If continues consider Singulair     7. Urinary symptom or sign  UA     8.  Abdominal cramps  Dicyclomine prn       Please return to the clinic if you are having persistent or worsening symptoms   Kishan Fuentes

## 2022-01-08 LAB
% SATURATION: 19 % (CALC) (ref 16–45)
ABSOLUTE BASOPHILS: 62 CELLS/UL (ref 0–200)
ABSOLUTE EOSINOPHILS: 140 CELLS/UL (ref 15–500)
ABSOLUTE LYMPHOCYTES: 1914 CELLS/UL (ref 850–3900)
ABSOLUTE MONOCYTES: 281 CELLS/UL (ref 200–950)
ABSOLUTE NEUTROPHILS: 2803 CELLS/UL (ref 1500–7800)
ALBUMIN/GLOBULIN RATIO: 1.4 (CALC) (ref 1–2.5)
ALBUMIN: 4.2 G/DL (ref 3.6–5.1)
ALKALINE PHOSPHATASE: 58 U/L (ref 31–125)
ALT: 17 U/L (ref 6–29)
AST: 23 U/L (ref 10–30)
BASOPHILS: 1.2 %
BILIRUBIN, TOTAL: 0.4 MG/DL (ref 0.2–1.2)
BILIRUBIN: NEGATIVE
BUN: 16 MG/DL (ref 7–25)
CALCIUM: 9.3 MG/DL (ref 8.6–10.2)
CARBON DIOXIDE: 24 MMOL/L (ref 20–32)
CHLORIDE: 104 MMOL/L (ref 98–110)
CHOL/HDLC RATIO: 2.7 (CALC)
CHOLESTEROL, TOTAL: 222 MG/DL
COLOR: YELLOW
CREATININE: 0.63 MG/DL (ref 0.5–1.1)
EGFR IF AFRICN AM: 135 ML/MIN/1.73M2
EGFR IF NONAFRICN AM: 116 ML/MIN/1.73M2
EOSINOPHILS: 2.7 %
FERRITIN: 10 NG/ML (ref 16–154)
GLOBULIN: 3 G/DL (CALC) (ref 1.9–3.7)
GLUCOSE: 87 MG/DL (ref 65–99)
GLUCOSE: NEGATIVE
HDL CHOLESTEROL: 82 MG/DL
HEMATOCRIT: 37.7 % (ref 35–45)
HEMOGLOBIN A1C: 5 % OF TOTAL HGB
HEMOGLOBIN: 12.6 G/DL (ref 11.7–15.5)
IRON BINDING CAPACITY: 356 MCG/DL (CALC) (ref 250–450)
IRON, TOTAL: 69 MCG/DL (ref 40–190)
KETONES: NEGATIVE
LDL-CHOLESTEROL: 125 MG/DL (CALC)
LEUKOCYTE ESTERASE: NEGATIVE
LYMPHOCYTES: 36.8 %
MCH: 29 PG (ref 27–33)
MCHC: 33.4 G/DL (ref 32–36)
MCV: 86.7 FL (ref 80–100)
MONOCYTES: 5.4 %
MPV: 11.3 FL (ref 7.5–12.5)
NEUTROPHILS: 53.9 %
NITRITE: NEGATIVE
NON-HDL CHOLESTEROL: 140 MG/DL (CALC)
OCCULT BLOOD: NEGATIVE
PH: 7 (ref 5–8)
PLATELET COUNT: 282 THOUSAND/UL (ref 140–400)
POTASSIUM: 4.2 MMOL/L (ref 3.5–5.3)
PROTEIN, TOTAL: 7.2 G/DL (ref 6.1–8.1)
PROTEIN: NEGATIVE
RDW: 12.5 % (ref 11–15)
RED BLOOD CELL COUNT: 4.35 MILLION/UL (ref 3.8–5.1)
SODIUM: 138 MMOL/L (ref 135–146)
SPECIFIC GRAVITY: 1.02 (ref 1–1.03)
TRIGLYCERIDES: 63 MG/DL
TSH W/REFLEX TO FT4: 1.9 MIU/L
VITAMIN D, 25-OH, TOTAL: 18 NG/ML (ref 30–100)
WHITE BLOOD CELL COUNT: 5.2 THOUSAND/UL (ref 3.8–10.8)

## 2022-01-11 DIAGNOSIS — E21.3 HYPERPARATHYROIDISM (HCC): ICD-10-CM

## 2022-01-11 DIAGNOSIS — E61.1 IRON DEFICIENCY: Primary | ICD-10-CM

## 2022-01-11 DIAGNOSIS — R07.89 ATYPICAL CHEST PAIN: ICD-10-CM

## 2022-01-11 RX ORDER — ERGOCALCIFEROL 1.25 MG/1
50000 CAPSULE ORAL WEEKLY
Qty: 12 CAPSULE | Refills: 1 | Status: SHIPPED | OUTPATIENT
Start: 2022-01-11 | End: 2022-03-30

## 2022-01-11 RX ORDER — FERROUS SULFATE TAB EC 324 MG (65 MG FE EQUIVALENT) 324 (65 FE) MG
1 TABLET DELAYED RESPONSE ORAL 2 TIMES DAILY
Qty: 180 TABLET | Refills: 1 | Status: SHIPPED | OUTPATIENT
Start: 2022-01-11 | End: 2022-04-11

## 2022-01-12 DIAGNOSIS — R82.90 ABNORMAL URINALYSIS: Primary | ICD-10-CM

## 2022-01-14 ENCOUNTER — OFFICE VISIT (OUTPATIENT)
Dept: SURGERY | Facility: CLINIC | Age: 37
End: 2022-01-14
Payer: MEDICAID

## 2022-01-14 VITALS — BODY MASS INDEX: 34.83 KG/M2 | HEIGHT: 64 IN | WEIGHT: 204 LBS

## 2022-01-14 DIAGNOSIS — N62 MACROMASTIA: Primary | ICD-10-CM

## 2022-01-14 PROCEDURE — 99213 OFFICE O/P EST LOW 20 MIN: CPT | Performed by: SURGERY

## 2022-01-14 PROCEDURE — 3008F BODY MASS INDEX DOCD: CPT | Performed by: SURGERY

## 2022-01-14 NOTE — PROGRESS NOTES
Keon Amaral is a 29year old female who presents today for a follow-up. She is without new complaints and would like to proceed with reduction mammoplasty.       Physical Examination:   01/14/22  1504   Weight: 92.5 kg (204 lb)   Height: 1.626 m (5' 4\") was reviewed with the patient and questions were answered. A total of 20 minutes was spent discussing the nature and technique of surgery as well as postoperative course and coordinating the patient's care.

## 2022-01-15 ENCOUNTER — LAB ENCOUNTER (OUTPATIENT)
Dept: LAB | Facility: HOSPITAL | Age: 37
End: 2022-01-15
Attending: INTERNAL MEDICINE
Payer: MEDICAID

## 2022-01-15 DIAGNOSIS — R07.89 ATYPICAL CHEST PAIN: ICD-10-CM

## 2022-01-15 LAB
APPEARANCE: CLEAR
BILIRUBIN: NEGATIVE
COLOR: YELLOW
GLUCOSE: NEGATIVE
KETONES: NEGATIVE
LEUKOCYTE ESTERASE: NEGATIVE
NITRITE: NEGATIVE
OCCULT BLOOD: NEGATIVE
PH: 8.5 (ref 5–8)
SPECIFIC GRAVITY: 1.02 (ref 1–1.03)

## 2022-01-15 PROCEDURE — 93010 ELECTROCARDIOGRAM REPORT: CPT | Performed by: INTERNAL MEDICINE

## 2022-01-15 PROCEDURE — 93005 ELECTROCARDIOGRAM TRACING: CPT

## 2022-01-17 ENCOUNTER — TELEPHONE (OUTPATIENT)
Dept: SURGERY | Facility: CLINIC | Age: 37
End: 2022-01-17

## 2022-01-17 DIAGNOSIS — N62 MACROMASTIA: Primary | ICD-10-CM

## 2022-01-17 DIAGNOSIS — E21.3 HYPERPARATHYROIDISM (HCC): ICD-10-CM

## 2022-01-17 DIAGNOSIS — R94.31 ABNORMAL EKG: Primary | ICD-10-CM

## 2022-01-17 DIAGNOSIS — R07.89 ATYPICAL CHEST PAIN: ICD-10-CM

## 2022-01-17 LAB — PARATHYROID HORMONE,$INTACT: 112 PG/ML (ref 14–64)

## 2022-01-17 NOTE — TELEPHONE ENCOUNTER
Calling pt in regards to scheduling surgery. Informed pt that I have 03/23/2022 available at [facility] with Dr. Andreina Hardin. Pt verbalized understanding and in agreement with date and location. All questions answered.    Encouraged pt to call or MyChart

## 2022-01-19 LAB
GLIADIN (DEAMIDATED) AB (IGG): <1 U/ML
GLIADIN (DEAMIDATED)$AB (IGA): <1 U/ML
IMMUNOGLOBULIN A: 297 MG/DL (ref 47–310)
TISSUE TRANSGLUTAMINASE$ANTIBODY, IGA: <1 U/ML
TISSUE TRANSGLUTAMINASE$ANTIBODY, IGG: <1 U/ML

## 2022-01-31 ENCOUNTER — NURSE ONLY (OUTPATIENT)
Dept: LAB | Age: 37
End: 2022-01-31
Attending: INTERNAL MEDICINE
Payer: MEDICAID

## 2022-01-31 ENCOUNTER — TELEMEDICINE (OUTPATIENT)
Dept: INTERNAL MEDICINE CLINIC | Facility: CLINIC | Age: 37
End: 2022-01-31

## 2022-01-31 DIAGNOSIS — R05.9 COUGH: ICD-10-CM

## 2022-01-31 DIAGNOSIS — J45.909 ASTHMATIC BRONCHITIS WITHOUT COMPLICATION, UNSPECIFIED ASTHMA SEVERITY, UNSPECIFIED WHETHER PERSISTENT: ICD-10-CM

## 2022-01-31 DIAGNOSIS — J45.909 ASTHMATIC BRONCHITIS WITHOUT COMPLICATION, UNSPECIFIED ASTHMA SEVERITY, UNSPECIFIED WHETHER PERSISTENT: Primary | ICD-10-CM

## 2022-01-31 PROCEDURE — 99213 OFFICE O/P EST LOW 20 MIN: CPT | Performed by: INTERNAL MEDICINE

## 2022-01-31 RX ORDER — CODEINE PHOSPHATE AND GUAIFENESIN 10; 100 MG/5ML; MG/5ML
5 SOLUTION ORAL 4 TIMES DAILY PRN
Qty: 118 ML | Refills: 0 | Status: SHIPPED | OUTPATIENT
Start: 2022-01-31

## 2022-01-31 RX ORDER — METHYLPREDNISOLONE 4 MG/1
TABLET ORAL
Qty: 1 EACH | Refills: 0 | Status: SHIPPED | OUTPATIENT
Start: 2022-01-31

## 2022-01-31 RX ORDER — AZITHROMYCIN 250 MG/1
TABLET, FILM COATED ORAL
Qty: 6 TABLET | Refills: 0 | Status: SHIPPED | OUTPATIENT
Start: 2022-01-31 | End: 2022-02-05

## 2022-01-31 NOTE — PROGRESS NOTES
This visit was conducted using telemedicine with live interactive video and audio   Patient verbally consents to conduct video visit   Patient understands and accepts financial responsibility for any deductible, co-insurance and/or co-pays associated with as needed for Wheezing or Shortness of Breath. 2 each 2   • ergocalciferol 1.25 MG (30468 UT) Oral Cap Take 1 capsule (50,000 Units total) by mouth every 30 (thirty) days. 3 capsule 0   • Oxymetazoline HCl (NASAL SPRAY NA) by Nasal route.      • Multiple Vi Visit:  Requested Prescriptions     Signed Prescriptions Disp Refills   • azithromycin (ZITHROMAX Z-ADAIR) 250 MG Oral Tab 6 tablet 0     Sig: Take 2 tablets (500 mg total) by mouth daily for 1 day, THEN 1 tablet (250 mg total) daily for 4 days.    • methylPR

## 2022-02-01 LAB — SARS-COV-2 RNA RESP QL NAA+PROBE: NOT DETECTED

## 2022-02-26 ENCOUNTER — LAB ENCOUNTER (OUTPATIENT)
Dept: LAB | Facility: HOSPITAL | Age: 37
End: 2022-02-26
Attending: INTERNAL MEDICINE
Payer: MEDICAID

## 2022-02-26 ENCOUNTER — HOSPITAL ENCOUNTER (OUTPATIENT)
Dept: GENERAL RADIOLOGY | Facility: HOSPITAL | Age: 37
Discharge: HOME | End: 2022-02-26
Attending: INTERNAL MEDICINE
Payer: MEDICAID

## 2022-02-26 DIAGNOSIS — Z95.2 S/P AVR: Primary | ICD-10-CM

## 2022-02-26 DIAGNOSIS — R05.9 COUGH: ICD-10-CM

## 2022-02-26 DIAGNOSIS — E61.1 IRON DEFICIENCY: ICD-10-CM

## 2022-02-26 DIAGNOSIS — J45.909 ASTHMATIC BRONCHITIS WITHOUT COMPLICATION, UNSPECIFIED ASTHMA SEVERITY, UNSPECIFIED WHETHER PERSISTENT: ICD-10-CM

## 2022-02-26 LAB
BASOPHILS # BLD AUTO: 0.04 X10(3) UL (ref 0–0.2)
BASOPHILS NFR BLD AUTO: 0.7 %
DEPRECATED RDW RBC AUTO: 41.4 FL (ref 35.1–46.3)
EOSINOPHIL # BLD AUTO: 0.14 X10(3) UL (ref 0–0.7)
EOSINOPHIL NFR BLD AUTO: 2.5 %
ERYTHROCYTE [DISTWIDTH] IN BLOOD BY AUTOMATED COUNT: 13 % (ref 11–15)
EST. AVERAGE GLUCOSE BLD GHB EST-MCNC: 100 MG/DL (ref 68–126)
HBA1C MFR BLD: 5.1 % (ref ?–5.7)
HCT VFR BLD AUTO: 40.6 %
HGB BLD-MCNC: 13.5 G/DL
IGA SERPL-MCNC: 279 MG/DL (ref 70–312)
IMM GRANULOCYTES # BLD AUTO: 0.02 X10(3) UL (ref 0–1)
IMM GRANULOCYTES NFR BLD: 0.4 %
LYMPHOCYTES # BLD AUTO: 1.86 X10(3) UL (ref 1–4)
MCH RBC QN AUTO: 29.3 PG (ref 26–34)
MCHC RBC AUTO-ENTMCNC: 33.3 G/DL (ref 31–37)
MCV RBC AUTO: 88.1 FL
MONOCYTES # BLD AUTO: 0.28 X10(3) UL (ref 0.1–1)
MONOCYTES NFR BLD AUTO: 5 %
NEUTROPHILS # BLD AUTO: 3.22 X10 (3) UL (ref 1.5–7.7)
NEUTROPHILS # BLD AUTO: 3.22 X10(3) UL (ref 1.5–7.7)
NEUTROPHILS NFR BLD AUTO: 57.9 %
PLATELET # BLD AUTO: 264 10(3)UL (ref 150–450)
PTH-INTACT SERPL-MCNC: 135.9 PG/ML (ref 18.5–88)
RBC # BLD AUTO: 4.61 X10(6)UL
WBC # BLD AUTO: 5.6 X10(3) UL (ref 4–11)

## 2022-02-26 PROCEDURE — 87086 URINE CULTURE/COLONY COUNT: CPT

## 2022-02-26 PROCEDURE — 82784 ASSAY IGA/IGD/IGG/IGM EACH: CPT

## 2022-02-26 PROCEDURE — 87186 SC STD MICRODIL/AGAR DIL: CPT

## 2022-02-26 PROCEDURE — 86364 TISS TRNSGLTMNASE EA IG CLAS: CPT

## 2022-02-26 PROCEDURE — 87077 CULTURE AEROBIC IDENTIFY: CPT

## 2022-02-26 PROCEDURE — 83036 HEMOGLOBIN GLYCOSYLATED A1C: CPT

## 2022-02-26 PROCEDURE — 85025 COMPLETE CBC W/AUTO DIFF WBC: CPT

## 2022-02-26 PROCEDURE — 83970 ASSAY OF PARATHORMONE: CPT | Performed by: INTERNAL MEDICINE

## 2022-02-26 PROCEDURE — 36415 COLL VENOUS BLD VENIPUNCTURE: CPT

## 2022-02-26 PROCEDURE — 71046 X-RAY EXAM CHEST 2 VIEWS: CPT | Performed by: INTERNAL MEDICINE

## 2022-02-27 RX ORDER — SULFAMETHOXAZOLE AND TRIMETHOPRIM 800; 160 MG/1; MG/1
1 TABLET ORAL 2 TIMES DAILY
Qty: 10 TABLET | Refills: 0 | Status: SHIPPED | OUTPATIENT
Start: 2022-02-27 | End: 2022-03-04

## 2022-03-01 ENCOUNTER — OFFICE VISIT (OUTPATIENT)
Dept: INTERNAL MEDICINE CLINIC | Facility: CLINIC | Age: 37
End: 2022-03-01
Payer: MEDICAID

## 2022-03-01 ENCOUNTER — NURSE ONLY (OUTPATIENT)
Dept: LAB | Age: 37
End: 2022-03-01
Attending: INTERNAL MEDICINE
Payer: MEDICAID

## 2022-03-01 VITALS
HEART RATE: 100 BPM | BODY MASS INDEX: 35.51 KG/M2 | OXYGEN SATURATION: 99 % | HEIGHT: 64 IN | SYSTOLIC BLOOD PRESSURE: 120 MMHG | WEIGHT: 208 LBS | DIASTOLIC BLOOD PRESSURE: 80 MMHG

## 2022-03-01 DIAGNOSIS — Z01.818 PREOP EXAMINATION: ICD-10-CM

## 2022-03-01 DIAGNOSIS — J39.2 THROAT IRRITATION: ICD-10-CM

## 2022-03-01 DIAGNOSIS — E21.3 HYPERPARATHYROIDISM (HCC): ICD-10-CM

## 2022-03-01 DIAGNOSIS — N39.0 URINARY TRACT INFECTION WITHOUT HEMATURIA, SITE UNSPECIFIED: ICD-10-CM

## 2022-03-01 DIAGNOSIS — N62 MACROMASTIA: ICD-10-CM

## 2022-03-01 DIAGNOSIS — M25.569 KNEE PAIN, UNSPECIFIED CHRONICITY, UNSPECIFIED LATERALITY: ICD-10-CM

## 2022-03-01 DIAGNOSIS — Z01.818 PREOP EXAMINATION: Primary | ICD-10-CM

## 2022-03-01 LAB — TTG IGA SER-ACNC: 1.1 U/ML (ref ?–7)

## 2022-03-01 PROCEDURE — 3074F SYST BP LT 130 MM HG: CPT | Performed by: INTERNAL MEDICINE

## 2022-03-01 PROCEDURE — 99214 OFFICE O/P EST MOD 30 MIN: CPT | Performed by: INTERNAL MEDICINE

## 2022-03-01 PROCEDURE — 36415 COLL VENOUS BLD VENIPUNCTURE: CPT | Performed by: INTERNAL MEDICINE

## 2022-03-01 PROCEDURE — 3079F DIAST BP 80-89 MM HG: CPT | Performed by: INTERNAL MEDICINE

## 2022-03-01 PROCEDURE — 3008F BODY MASS INDEX DOCD: CPT | Performed by: INTERNAL MEDICINE

## 2022-03-03 LAB
ALBUMIN/GLOBULIN RATIO: 1.5 (CALC) (ref 1–2.5)
ALBUMIN: 4.1 G/DL (ref 3.6–5.1)
ALKALINE PHOSPHATASE: 52 U/L (ref 31–125)
ALT: 9 U/L (ref 6–29)
AST: 15 U/L (ref 10–30)
BILIRUBIN, TOTAL: 0.4 MG/DL (ref 0.2–1.2)
BUN: 15 MG/DL (ref 7–25)
CALCIUM: 9.2 MG/DL (ref 8.6–10.2)
CARBON DIOXIDE: 26 MMOL/L (ref 20–32)
CHLORIDE: 103 MMOL/L (ref 98–110)
CREATININE: 0.66 MG/DL (ref 0.5–1.1)
EGFR IF AFRICN AM: 132 ML/MIN/1.73M2
EGFR IF NONAFRICN AM: 114 ML/MIN/1.73M2
GLOBULIN: 2.8 G/DL (CALC) (ref 1.9–3.7)
GLUCOSE: 89 MG/DL (ref 65–99)
PROTEIN, TOTAL: 6.9 G/DL (ref 6.1–8.1)
SARS-COV-2 RNA RESP QL NAA+PROBE: NOT DETECTED
SODIUM: 137 MMOL/L (ref 135–146)

## 2022-03-15 ENCOUNTER — TELEPHONE (OUTPATIENT)
Dept: SURGERY | Facility: CLINIC | Age: 37
End: 2022-03-15

## 2022-03-21 ENCOUNTER — LAB ENCOUNTER (OUTPATIENT)
Dept: LAB | Facility: HOSPITAL | Age: 37
End: 2022-03-21
Attending: SURGERY
Payer: MEDICAID

## 2022-03-21 ENCOUNTER — HOSPITAL ENCOUNTER (OUTPATIENT)
Dept: MAMMOGRAPHY | Facility: HOSPITAL | Age: 37
Discharge: HOME | End: 2022-03-21
Attending: SURGERY
Payer: MEDICAID

## 2022-03-21 DIAGNOSIS — N62 MACROMASTIA: ICD-10-CM

## 2022-03-21 DIAGNOSIS — N62 LARGE BREASTS: ICD-10-CM

## 2022-03-21 LAB — SARS-COV-2 RNA RESP QL NAA+PROBE: NOT DETECTED

## 2022-03-21 PROCEDURE — 77062 BREAST TOMOSYNTHESIS BI: CPT | Performed by: SURGERY

## 2022-03-21 PROCEDURE — 77066 DX MAMMO INCL CAD BI: CPT | Performed by: SURGERY

## 2022-03-23 ENCOUNTER — HOSPITAL ENCOUNTER (OUTPATIENT)
Facility: HOSPITAL | Age: 37
Setting detail: HOSPITAL OUTPATIENT SURGERY
Discharge: HOME | End: 2022-03-23
Attending: SURGERY | Admitting: SURGERY
Payer: MEDICAID

## 2022-03-23 ENCOUNTER — ANESTHESIA (OUTPATIENT)
Dept: SURGERY | Facility: HOSPITAL | Age: 37
End: 2022-03-23
Payer: MEDICAID

## 2022-03-23 ENCOUNTER — ANESTHESIA EVENT (OUTPATIENT)
Dept: SURGERY | Facility: HOSPITAL | Age: 37
End: 2022-03-23
Payer: MEDICAID

## 2022-03-23 VITALS
HEIGHT: 64 IN | SYSTOLIC BLOOD PRESSURE: 112 MMHG | DIASTOLIC BLOOD PRESSURE: 80 MMHG | TEMPERATURE: 99 F | RESPIRATION RATE: 18 BRPM | BODY MASS INDEX: 35.04 KG/M2 | HEART RATE: 83 BPM | OXYGEN SATURATION: 98 % | WEIGHT: 205.25 LBS

## 2022-03-23 DIAGNOSIS — N62 MACROMASTIA: ICD-10-CM

## 2022-03-23 DIAGNOSIS — N62 LARGE BREASTS: Primary | ICD-10-CM

## 2022-03-23 LAB — B-HCG UR QL: NEGATIVE

## 2022-03-23 PROCEDURE — 0HBV0ZZ EXCISION OF BILATERAL BREAST, OPEN APPROACH: ICD-10-PCS | Performed by: SURGERY

## 2022-03-23 PROCEDURE — 81025 URINE PREGNANCY TEST: CPT

## 2022-03-23 PROCEDURE — 76942 ECHO GUIDE FOR BIOPSY: CPT | Performed by: ANESTHESIOLOGY

## 2022-03-23 PROCEDURE — 88305 TISSUE EXAM BY PATHOLOGIST: CPT | Performed by: SURGERY

## 2022-03-23 RX ORDER — BUPIVACAINE HYDROCHLORIDE AND EPINEPHRINE 2.5; 5 MG/ML; UG/ML
INJECTION, SOLUTION EPIDURAL; INFILTRATION; INTRACAUDAL; PERINEURAL AS NEEDED
Status: DISCONTINUED | OUTPATIENT
Start: 2022-03-23 | End: 2022-03-23 | Stop reason: SURG

## 2022-03-23 RX ORDER — MEPERIDINE HYDROCHLORIDE 25 MG/ML
12.5 INJECTION INTRAMUSCULAR; INTRAVENOUS; SUBCUTANEOUS AS NEEDED
Status: DISCONTINUED | OUTPATIENT
Start: 2022-03-23 | End: 2022-03-23

## 2022-03-23 RX ORDER — OXYCODONE HYDROCHLORIDE 5 MG/1
5 TABLET ORAL ONCE AS NEEDED
Status: DISCONTINUED | OUTPATIENT
Start: 2022-03-23 | End: 2022-03-23

## 2022-03-23 RX ORDER — DOCUSATE SODIUM 100 MG/1
100 CAPSULE, LIQUID FILLED ORAL 2 TIMES DAILY
Qty: 40 CAPSULE | Refills: 0 | Status: SHIPPED | OUTPATIENT
Start: 2022-03-23

## 2022-03-23 RX ORDER — DIPHENHYDRAMINE HYDROCHLORIDE 50 MG/ML
INJECTION INTRAMUSCULAR; INTRAVENOUS AS NEEDED
Status: DISCONTINUED | OUTPATIENT
Start: 2022-03-23 | End: 2022-03-23 | Stop reason: SURG

## 2022-03-23 RX ORDER — SODIUM CHLORIDE, SODIUM LACTATE, POTASSIUM CHLORIDE, CALCIUM CHLORIDE 600; 310; 30; 20 MG/100ML; MG/100ML; MG/100ML; MG/100ML
INJECTION, SOLUTION INTRAVENOUS CONTINUOUS
Status: DISCONTINUED | OUTPATIENT
Start: 2022-03-23 | End: 2022-03-23

## 2022-03-23 RX ORDER — ONDANSETRON 4 MG/1
4 TABLET, FILM COATED ORAL EVERY 8 HOURS PRN
Qty: 30 TABLET | Refills: 0 | Status: SHIPPED | OUTPATIENT
Start: 2022-03-23

## 2022-03-23 RX ORDER — TRANEXAMIC ACID 10 MG/ML
INJECTION, SOLUTION INTRAVENOUS AS NEEDED
Status: DISCONTINUED | OUTPATIENT
Start: 2022-03-23 | End: 2022-03-23 | Stop reason: SURG

## 2022-03-23 RX ORDER — LIDOCAINE HYDROCHLORIDE 10 MG/ML
INJECTION, SOLUTION EPIDURAL; INFILTRATION; INTRACAUDAL; PERINEURAL AS NEEDED
Status: DISCONTINUED | OUTPATIENT
Start: 2022-03-23 | End: 2022-03-23 | Stop reason: SURG

## 2022-03-23 RX ORDER — ACETAMINOPHEN 500 MG
1000 TABLET ORAL ONCE
OUTPATIENT
Start: 2022-03-23 | End: 2022-03-23

## 2022-03-23 RX ORDER — ACETAMINOPHEN 500 MG
1000 TABLET ORAL ONCE
Status: DISCONTINUED | OUTPATIENT
Start: 2022-03-23 | End: 2022-03-23 | Stop reason: HOSPADM

## 2022-03-23 RX ORDER — GLYCOPYRROLATE 0.2 MG/ML
INJECTION, SOLUTION INTRAMUSCULAR; INTRAVENOUS AS NEEDED
Status: DISCONTINUED | OUTPATIENT
Start: 2022-03-23 | End: 2022-03-23 | Stop reason: SURG

## 2022-03-23 RX ORDER — DEXAMETHASONE SODIUM PHOSPHATE 4 MG/ML
VIAL (ML) INJECTION AS NEEDED
Status: DISCONTINUED | OUTPATIENT
Start: 2022-03-23 | End: 2022-03-23 | Stop reason: SURG

## 2022-03-23 RX ORDER — ONDANSETRON 2 MG/ML
4 INJECTION INTRAMUSCULAR; INTRAVENOUS AS NEEDED
Status: DISCONTINUED | OUTPATIENT
Start: 2022-03-23 | End: 2022-03-23

## 2022-03-23 RX ORDER — CEFAZOLIN SODIUM/WATER 2 G/20 ML
SYRINGE (ML) INTRAVENOUS
Status: DISPENSED
Start: 2022-03-23 | End: 2022-03-23

## 2022-03-23 RX ORDER — LABETALOL HYDROCHLORIDE 5 MG/ML
5 INJECTION, SOLUTION INTRAVENOUS EVERY 5 MIN PRN
Status: DISCONTINUED | OUTPATIENT
Start: 2022-03-23 | End: 2022-03-23

## 2022-03-23 RX ORDER — MIDAZOLAM HYDROCHLORIDE 1 MG/ML
1 INJECTION INTRAMUSCULAR; INTRAVENOUS EVERY 5 MIN PRN
Status: DISCONTINUED | OUTPATIENT
Start: 2022-03-23 | End: 2022-03-23

## 2022-03-23 RX ORDER — SODIUM CHLORIDE 9 MG/ML
INJECTION, SOLUTION INTRAVENOUS CONTINUOUS PRN
Status: DISCONTINUED | OUTPATIENT
Start: 2022-03-23 | End: 2022-03-23 | Stop reason: SURG

## 2022-03-23 RX ORDER — TRAMADOL HYDROCHLORIDE 50 MG/1
50 TABLET ORAL ONCE AS NEEDED
Status: DISCONTINUED | OUTPATIENT
Start: 2022-03-23 | End: 2022-03-23

## 2022-03-23 RX ORDER — CEFAZOLIN SODIUM 1 G/3ML
INJECTION, POWDER, FOR SOLUTION INTRAMUSCULAR; INTRAVENOUS AS NEEDED
Status: DISCONTINUED | OUTPATIENT
Start: 2022-03-23 | End: 2022-03-23 | Stop reason: SURG

## 2022-03-23 RX ORDER — HYDROCODONE BITARTRATE AND ACETAMINOPHEN 5; 325 MG/1; MG/1
1-2 TABLET ORAL EVERY 4 HOURS PRN
Qty: 40 TABLET | Refills: 0 | Status: SHIPPED | OUTPATIENT
Start: 2022-03-23

## 2022-03-23 RX ORDER — ONDANSETRON 2 MG/ML
INJECTION INTRAMUSCULAR; INTRAVENOUS AS NEEDED
Status: DISCONTINUED | OUTPATIENT
Start: 2022-03-23 | End: 2022-03-23 | Stop reason: SURG

## 2022-03-23 RX ORDER — CEFAZOLIN SODIUM/WATER 2 G/20 ML
2 SYRINGE (ML) INTRAVENOUS ONCE
Status: COMPLETED | OUTPATIENT
Start: 2022-03-23 | End: 2022-03-23

## 2022-03-23 RX ORDER — ROCURONIUM BROMIDE 10 MG/ML
INJECTION, SOLUTION INTRAVENOUS AS NEEDED
Status: DISCONTINUED | OUTPATIENT
Start: 2022-03-23 | End: 2022-03-23 | Stop reason: SURG

## 2022-03-23 RX ORDER — ACETAMINOPHEN 500 MG
1000 TABLET ORAL ONCE AS NEEDED
Status: DISCONTINUED | OUTPATIENT
Start: 2022-03-23 | End: 2022-03-23

## 2022-03-23 RX ORDER — DIPHENHYDRAMINE HYDROCHLORIDE 50 MG/ML
12.5 INJECTION INTRAMUSCULAR; INTRAVENOUS AS NEEDED
Status: DISCONTINUED | OUTPATIENT
Start: 2022-03-23 | End: 2022-03-23

## 2022-03-23 RX ORDER — NEOSTIGMINE METHYLSULFATE 1 MG/ML
INJECTION INTRAVENOUS AS NEEDED
Status: DISCONTINUED | OUTPATIENT
Start: 2022-03-23 | End: 2022-03-23 | Stop reason: SURG

## 2022-03-23 RX ORDER — SCOLOPAMINE TRANSDERMAL SYSTEM 1 MG/1
PATCH, EXTENDED RELEASE TRANSDERMAL
Status: DISCONTINUED
Start: 2022-03-23 | End: 2022-03-23

## 2022-03-23 RX ORDER — HYDROCODONE BITARTRATE AND ACETAMINOPHEN 5; 325 MG/1; MG/1
2 TABLET ORAL AS NEEDED
Status: COMPLETED | OUTPATIENT
Start: 2022-03-23 | End: 2022-03-23

## 2022-03-23 RX ORDER — MIDAZOLAM HYDROCHLORIDE 1 MG/ML
INJECTION INTRAMUSCULAR; INTRAVENOUS AS NEEDED
Status: DISCONTINUED | OUTPATIENT
Start: 2022-03-23 | End: 2022-03-23 | Stop reason: SURG

## 2022-03-23 RX ORDER — HYDROMORPHONE HYDROCHLORIDE 1 MG/ML
0.4 INJECTION, SOLUTION INTRAMUSCULAR; INTRAVENOUS; SUBCUTANEOUS EVERY 5 MIN PRN
Status: DISCONTINUED | OUTPATIENT
Start: 2022-03-23 | End: 2022-03-23

## 2022-03-23 RX ORDER — METOCLOPRAMIDE HYDROCHLORIDE 5 MG/ML
10 INJECTION INTRAMUSCULAR; INTRAVENOUS AS NEEDED
Status: DISCONTINUED | OUTPATIENT
Start: 2022-03-23 | End: 2022-03-23

## 2022-03-23 RX ORDER — NALOXONE HYDROCHLORIDE 0.4 MG/ML
80 INJECTION, SOLUTION INTRAMUSCULAR; INTRAVENOUS; SUBCUTANEOUS AS NEEDED
Status: DISCONTINUED | OUTPATIENT
Start: 2022-03-23 | End: 2022-03-23

## 2022-03-23 RX ORDER — HYDROCODONE BITARTRATE AND ACETAMINOPHEN 5; 325 MG/1; MG/1
1 TABLET ORAL AS NEEDED
Status: COMPLETED | OUTPATIENT
Start: 2022-03-23 | End: 2022-03-23

## 2022-03-23 RX ORDER — HYDROMORPHONE HYDROCHLORIDE 1 MG/ML
INJECTION, SOLUTION INTRAMUSCULAR; INTRAVENOUS; SUBCUTANEOUS
Status: COMPLETED
Start: 2022-03-23 | End: 2022-03-23

## 2022-03-23 RX ORDER — SCOLOPAMINE TRANSDERMAL SYSTEM 1 MG/1
1 PATCH, EXTENDED RELEASE TRANSDERMAL ONCE
Status: DISCONTINUED | OUTPATIENT
Start: 2022-03-23 | End: 2022-03-23

## 2022-03-23 RX ORDER — CLONIDINE 100 UG/ML
INJECTION, SOLUTION EPIDURAL AS NEEDED
Status: DISCONTINUED | OUTPATIENT
Start: 2022-03-23 | End: 2022-03-23 | Stop reason: SURG

## 2022-03-23 RX ADMIN — ROCURONIUM BROMIDE 10 MG: 10 INJECTION, SOLUTION INTRAVENOUS at 10:41:00

## 2022-03-23 RX ADMIN — SODIUM CHLORIDE, SODIUM LACTATE, POTASSIUM CHLORIDE, CALCIUM CHLORIDE: 600; 310; 30; 20 INJECTION, SOLUTION INTRAVENOUS at 08:37:00

## 2022-03-23 RX ADMIN — ONDANSETRON 4 MG: 2 INJECTION INTRAMUSCULAR; INTRAVENOUS at 13:03:00

## 2022-03-23 RX ADMIN — ROCURONIUM BROMIDE 20 MG: 10 INJECTION, SOLUTION INTRAVENOUS at 09:42:00

## 2022-03-23 RX ADMIN — CLONIDINE 50 MCG: 100 INJECTION, SOLUTION EPIDURAL at 08:04:00

## 2022-03-23 RX ADMIN — ROCURONIUM BROMIDE 10 MG: 10 INJECTION, SOLUTION INTRAVENOUS at 09:19:00

## 2022-03-23 RX ADMIN — BUPIVACAINE HYDROCHLORIDE AND EPINEPHRINE 60 ML: 2.5; 5 INJECTION, SOLUTION EPIDURAL; INFILTRATION; INTRACAUDAL; PERINEURAL at 08:04:00

## 2022-03-23 RX ADMIN — ROCURONIUM BROMIDE 10 MG: 10 INJECTION, SOLUTION INTRAVENOUS at 11:56:00

## 2022-03-23 RX ADMIN — NEOSTIGMINE METHYLSULFATE 5 MG: 1 INJECTION INTRAVENOUS at 13:19:00

## 2022-03-23 RX ADMIN — TRANEXAMIC ACID 1000 MG: 10 INJECTION, SOLUTION INTRAVENOUS at 07:50:00

## 2022-03-23 RX ADMIN — DEXAMETHASONE SODIUM PHOSPHATE 8 MG: 4 MG/ML VIAL (ML) INJECTION at 07:48:00

## 2022-03-23 RX ADMIN — GLYCOPYRROLATE 0.8 MG: 0.2 INJECTION, SOLUTION INTRAMUSCULAR; INTRAVENOUS at 13:19:00

## 2022-03-23 RX ADMIN — MIDAZOLAM HYDROCHLORIDE 2 MG: 1 INJECTION INTRAMUSCULAR; INTRAVENOUS at 07:48:00

## 2022-03-23 RX ADMIN — SODIUM CHLORIDE, SODIUM LACTATE, POTASSIUM CHLORIDE, CALCIUM CHLORIDE: 600; 310; 30; 20 INJECTION, SOLUTION INTRAVENOUS at 12:11:00

## 2022-03-23 RX ADMIN — DIPHENHYDRAMINE HYDROCHLORIDE 12.5 MG: 50 INJECTION INTRAMUSCULAR; INTRAVENOUS at 07:48:00

## 2022-03-23 RX ADMIN — ROCURONIUM BROMIDE 10 MG: 10 INJECTION, SOLUTION INTRAVENOUS at 12:16:00

## 2022-03-23 RX ADMIN — SODIUM CHLORIDE: 9 INJECTION, SOLUTION INTRAVENOUS at 09:20:00

## 2022-03-23 RX ADMIN — ROCURONIUM BROMIDE 10 MG: 10 INJECTION, SOLUTION INTRAVENOUS at 10:14:00

## 2022-03-23 RX ADMIN — ROCURONIUM BROMIDE 10 MG: 10 INJECTION, SOLUTION INTRAVENOUS at 11:31:00

## 2022-03-23 RX ADMIN — ROCURONIUM BROMIDE 20 MG: 10 INJECTION, SOLUTION INTRAVENOUS at 11:10:00

## 2022-03-23 RX ADMIN — CEFAZOLIN SODIUM/WATER 2 G: 2 G/20 ML SYRINGE (ML) INTRAVENOUS at 08:07:00

## 2022-03-23 RX ADMIN — CEFAZOLIN SODIUM 2 G: 1 INJECTION, POWDER, FOR SOLUTION INTRAMUSCULAR; INTRAVENOUS at 12:07:00

## 2022-03-23 RX ADMIN — ROCURONIUM BROMIDE 20 MG: 10 INJECTION, SOLUTION INTRAVENOUS at 08:46:00

## 2022-03-23 RX ADMIN — LIDOCAINE HYDROCHLORIDE 50 MG: 10 INJECTION, SOLUTION EPIDURAL; INFILTRATION; INTRACAUDAL; PERINEURAL at 07:48:00

## 2022-03-23 RX ADMIN — ROCURONIUM BROMIDE 50 MG: 10 INJECTION, SOLUTION INTRAVENOUS at 07:48:00

## 2022-03-23 NOTE — ANESTHESIA PROCEDURE NOTES
Regional Block  Performed by: Jose Bailey MD  Authorized by: Jose Bailey MD       General Information and Staff    Start Time:  3/23/2022 7:50 AM  End Time:  3/23/2022 8:05 AM  Anesthesiologist:  Jose Bailey MD  Performed by: Anesthesiologist  Patient Location:  OR    Block Placement: Post Induction  Site Identification: real time ultrasound guided and image stored and retrievable    Block site/laterality marked before start: site marked  Reason for Block: at surgeon's request and post-op pain management    Preanesthetic Checklist: 2 patient identifers, IV checked, risks and benefits discussed, monitors and equipment checked, pre-op evaluation, timeout performed, anesthesia consent, sterile technique used, no prohibitive neurological deficits and no local skin infection at insertion site      Procedure Details    Patient Position:  Supine  Prep: ChloraPrep    Monitoring:  Cardiac monitor, continuous pulse ox, blood pressure cuff and heart rate  Anesthesia block type: Serrateus Anterior Plane. Laterality:  Bilateral  Injection Technique:  Single-shot    Needle    Needle Type:  Short-bevel and echogenic  Needle Gauge:  21 G  Needle Length:  110 mm  Needle Localization:  Ultrasound guidance  Reason for Ultrasound Use: appropriate spread of the medication was noted in real time and no ultrasound evidence of intravascular and/or intraneural injection            Assessment    Injection Assessment:  Good spread noted, negative resistance, negative aspiration for heme, incremental injection and low pressure  Heart Rate Change: No    - Patient tolerated block procedure well without evidence of immediate block related complications. Medications      Additional Comments    Medication:  Bupivacaine 0.25% 60mL  with 1:200,000 epi + 50 mcg clonidine. 30 ml of this solution was injected on each side. Left side successful 1st pass.   Right side successful 3rd pass (needle and US was repositioned to obtain a better image).     Phoebe Gould MD  [facility]

## 2022-03-23 NOTE — ANESTHESIA PROCEDURE NOTES
Airway  Date/Time: 3/23/2022 7:49 AM  Urgency: elective    Airway not difficult    General Information and Staff    Patient location during procedure: OR  Anesthesiologist: Eduardo Vela MD  Performed: anesthesiologist     Indications and Patient Condition  Indications for airway management: anesthesia  Sedation level: deep  Preoxygenated: yes  Patient position: sniffing  Mask difficulty assessment: 1 - vent by mask    Final Airway Details  Final airway type: endotracheal airway      Successful airway: ETT  Cuffed: yes   Successful intubation technique: direct laryngoscopy  Facilitating devices/methods: intubating stylet and anterior pressure/BURP  Endotracheal tube insertion site: oral  Blade: Walter  Blade size: #3  ETT size (mm): 7.0    Cormack-Lehane Classification: grade IIA - partial view of glottis  Placement verified by: chest auscultation and capnometry   Measured from: teeth  ETT to teeth (cm): 21  Number of attempts at approach: 1  Number of other approaches attempted: 0    Additional Comments  Easy intubation. No evidence of facial, dental, or oral trauma.     Adriana Juarez MD  [facility]

## 2022-03-23 NOTE — BRIEF OP NOTE
Pre-Operative Diagnosis: Macromastia [N62]     Post-Operative Diagnosis: Macromastia [N62]      Procedure Performed:    Bilateral breast reduction    Surgeon(s) and Role:     Lennie Beach MD - Primary    Assistant(s):  PA: CHERYL Zuleta     Surgical Findings: as dictated     Specimen: right and left breast tissue      Estimated Blood Loss: Blood Output: 50 mL (3/23/2022  1:12 PM)    CHERYL Toribio-MICHELLE  3/23/2022  1:34 PM

## 2022-03-24 NOTE — OPERATIVE REPORT
[address]    PATIENT'S NAME: Glynn Villafana   ATTENDING PHYSICIAN: Giles Rachel M.D. OPERATING PHYSICIAN: Giles Rachel M.D. PATIENT ACCOUNT#:   [de-identified]    LOCATION:  [facility] 10  MEDICAL RECORD #:   [id number]       YOB: 1985  ADMISSION DATE:       03/23/2022      OPERATION DATE:  03/23/2022    OPERATIVE REPORT      PREOPERATIVE DIAGNOSIS:  Bilateral symptomatic gigantomastia. POSTOPERATIVE DIAGNOSIS:  Bilateral symptomatic gigantomastia. PROCEDURE:  Bilateral reduction mammoplasty. ASSISTANT:  Dandre Shetty PA-C. ANESTHESIA:  General with endotracheal intubation. ESTIMATED BLOOD LOSS:  50 mL. COMPLICATIONS:  None. INDICATIONS:  Patient is a 44-year-old female with bilateral symptomatic gigantomastia. The patient was seen preoperatively. Given her breast size and degree of ptosis, discussed the option of reduction via a lower pole breast amputation and free nipple graft technique. The risks, benefits, and alternatives were reviewed in detail with the patient. She expressed understanding and wished to proceed. FINDINGS:  Bilateral breast reduction via a lower pole breast amputation free nipple graft technique with excised weight of 2481 g in the right breast and 2540 g in the left breast.    OPERATIVE TECHNIQUE:  The patient was marked in preoperative holding in the upright position. The midline inframammary folds were marked. A Palma pattern excision was marked in the usual fashion. The patient was taken to the operating room, properly identified, placed in the supine position. Sequential compression devices were placed on bilateral lower extremities. Intravenous antibiotic prophylaxis was administered. The patient then underwent successful induction of general anesthesia and endotracheal intubation. The arms were placed abducted on foam-padded armboards and loosely secured with Kerlix.   A To catheter was placed in the usual fashion. Regional blocks were performed by Anesthesia. The chest was then prepped and draped sterilely. The procedure began on the right breast.  The areolar diameter was marked with a 45 mm areolar marker. The nipple-areolar complex was then excised as a full-thickness graft and placed on the back table in a moistened laparotomy pad. The Wise pattern skin incisions were then created, and using electrocautery, the lower pole of breast tissue was amputated. Additional vertical resection was performed centrally. Once adequate tissue had been removed, the wounds were irrigated with saline irrigation. Of note, larger perforating vessels were individually clipped and divided prior to division. The medial and lateral breast parenchyma was then reapproximated in the midline with interrupted 2-0 Vicryl sutures. The patient was then placed in the upright position. The flaps were transposed and tailor tacking was performed. Some additional skin resection was then performed. The flaps were then temporary stapled in place and attention was turned to the left breast.  The new areolar diameter was marked with a 45 mm areolar marker. The nipple-areolar complex was excised as a full-thickness graft and placed on the back table in a moistened laparotomy sponge. The Wise pattern incisions were then created with a scalpel and the lower pole of breast tissue was amputated with electrocautery. Again, central resection was performed vertically. Once adequate resection had been performed, the wounds were irrigated with saline irrigation. Hemostasis was secured with electrocautery. The medial and lateral breast parenchyma was reapproximated with interrupted 2-0 Vicryl sutures. The flaps were then transposed and stapled in place. The patient was placed in the upright position. Some additional skin resection was tailor tacked and marked. The skin was excised with a scalpel.   All the excised tissue was sent for permanent pathologic analysis as right and left breast tissue, respectively. The patient was then placed in the upright position. Shape and symmetry appeared adequate. The inframammary fold incisions were reapproximated with interrupted 2-0 Vicryl sutures on the superficial fascia, interrupted 2-0 Vicryl deep dermal sutures, and running 4-0 Monocryl subcuticular suture. The patient was then returned to the upright position. The new areolar position was marked with a 42 mm areolar marker and checked for symmetry. The nipple position was then de-epithelialized with a scalpel. Hemostasis was secured with electrocautery. The nipple-areolar grafts were then defatted and applied to the wound bed. They were secured with a running 5-0 chromic suture peripherally and several quilting sutures of 5-0 chromic. The grafts were then secured with a bolster consisting of mineral oil soaked cotton balls over Xeroform and tied over with 2-0 silk sutures. The incisions were dressed with Exofin and Steri-Strips. Fluff gauze and a surgical bra were applied. The patient was awakened, extubated, taken to the recovery area in stable condition. There were no operative complications. All needle, sponge, and instrument counts were correct at the end of the procedure. The weights of the resection were 2481 g on the right and 2540 g on the left. Dictated By Jude Lainez M.D.  d: 03/23/2022 14:09:44  t: 03/23/2022 23:51:15  [facility] [id number]  [facility]/

## 2022-03-28 ENCOUNTER — TELEPHONE (OUTPATIENT)
Dept: SURGERY | Facility: CLINIC | Age: 37
End: 2022-03-28

## 2022-03-28 NOTE — TELEPHONE ENCOUNTER
Patient called to discuss her constipation and bloating. Patient states that following her breast reduction, she did not have a BM for 5 days. Patient repots a BM yesterday but today is having stomach cramps and bloating with no successful BM. Patient has been taking the Colace twice daily since surgery. I discussed with her to discontinue the Norco as that can cause constipation. I instructed her to get OTC Miralax and switch her pain medication to Tylenol or Ibuprofen. Patient verbalized understanding and will go to her local pharmacy. Patient was instructed to call back if symptoms do not improve. Patient appreciative of the help.

## 2022-03-29 RX ORDER — ONDANSETRON 4 MG/1
4 TABLET, FILM COATED ORAL EVERY 8 HOURS PRN
Qty: 30 TABLET | Refills: 0 | Status: SHIPPED | OUTPATIENT
Start: 2022-03-29

## 2022-04-04 ENCOUNTER — OFFICE VISIT (OUTPATIENT)
Dept: SURGERY | Facility: CLINIC | Age: 37
End: 2022-04-04
Payer: MEDICAID

## 2022-04-04 DIAGNOSIS — N62 MACROMASTIA: Primary | ICD-10-CM

## 2022-04-04 PROCEDURE — 99024 POSTOP FOLLOW-UP VISIT: CPT | Performed by: PHYSICIAN ASSISTANT

## 2022-04-04 NOTE — PROGRESS NOTES
This is a 15-year-old female that is 12 days status post her bilateral breast reduction. She is here today for wound check, nipple bolster removal, and postop evaluation. She denies any fevers or signs of infections and surgery. She denies chest pain, calf pain, shortness of breath. She denies nausea or vomiting. She has been compliant with compression. She is taking Norco sparingly for pain without any adverse effects. Exam:  Bilateral breast incisions clean dry and intact no evidence of infection cellulitis or drainage, specifically no T-junction breakdown. Bilateral breast skin is without erythema ecchymosis hyperemia skin breakdown or necrosis  No evidence of hematoma or seroma bilaterally  Bilateral nipples viable following bolster removal  Good symmetry bilaterally    Impression:  12 days status post her bilateral breast reduction. She is here today for wound check, nipple bolster removal, and postop evaluation    Plan:  All continued wound care, signs of infection, reasons to contact our office reviewed in detail. She is doing very well postoperatively and both nipples remain viable after free nipple grafting. She will continue daily Neosporin and Telfa dressing changes bilaterally over the nipples after the Xeroform bolster is removed today. She will continue compression and types of bras were gone over in detail. I reviewed continued activity limitations. She is taking Tylenol only for pain. She will follow-up in 1 to 2 weeks for repeat wound check with Dr. Krishna Castillo.

## 2022-04-06 ENCOUNTER — TELEPHONE (OUTPATIENT)
Dept: SURGERY | Facility: CLINIC | Age: 37
End: 2022-04-06

## 2022-04-06 NOTE — TELEPHONE ENCOUNTER
Patient called stating she received a bill for the recent surgery with Dr. Jonathon Ramos. Patient was given the number to Patient Accounts.

## 2022-04-07 ENCOUNTER — TELEPHONE (OUTPATIENT)
Dept: SURGERY | Facility: CLINIC | Age: 37
End: 2022-04-07

## 2022-04-07 NOTE — TELEPHONE ENCOUNTER
Patient called again regarding her MyChart notification of the $17,000+. I informed her that this is still pending and can take up to a few months to be resolved. I informed her to call us if a bill comes in the mail. She was very appreciative of the call.

## 2022-04-12 ENCOUNTER — OFFICE VISIT (OUTPATIENT)
Dept: SURGERY | Facility: CLINIC | Age: 37
End: 2022-04-12
Payer: MEDICAID

## 2022-04-12 DIAGNOSIS — Z98.890 S/P REDUCTION MAMMOPLASTY: Primary | ICD-10-CM

## 2022-04-12 PROCEDURE — 99024 POSTOP FOLLOW-UP VISIT: CPT | Performed by: SURGERY

## 2022-04-12 NOTE — PROGRESS NOTES
Eliana Pagan is a 40year old female who presents today for a follow-up. She denies fever and chills. She denies nausea, vomiting, diarrhea or constipation. Physical Examination:  Breasts: Bilateral nipple areolar graft show good take. Steri-Strips removed. Incisions are clean dry and intact. Assessment and Plan:  Patient is doing well. She was instructed continue topical antibiotic ointment to the nipple areolar grafts for the next 10 days. She was instructed to continue sports bra or surgical bra compression. She will follow-up in 1 month for reassessment. Pathology results reviewed. The plan was reviewed with the patient and questions were answered.

## 2022-04-13 ENCOUNTER — TELEPHONE (OUTPATIENT)
Dept: SURGERY | Facility: CLINIC | Age: 37
End: 2022-04-13

## 2022-04-18 ENCOUNTER — TELEPHONE (OUTPATIENT)
Dept: SURGERY | Facility: CLINIC | Age: 37
End: 2022-04-18

## 2022-04-18 NOTE — TELEPHONE ENCOUNTER
Returning pt's call as she is extremely worried about a bill she received from her procedure with Dr. Ricki Montelongo. I informed the patient that I have already reviewed her case with the charge capture team and that they have already submitted an appeal.   As our office had a authorization on file for the procedure they believe the case was denied in error. I also stated I would check to see if we could jonathan her case as a do not bill for her until the appeal has been completed with her insurance. Sent an e-mail to the charge capture lead requesting this. I let the pt know that I would call her back with any additional information I receive. Pt verbalized understanding and was appreciative of my help.

## 2022-05-06 ENCOUNTER — TELEPHONE (OUTPATIENT)
Dept: SURGERY | Facility: CLINIC | Age: 37
End: 2022-05-06

## 2022-05-06 NOTE — TELEPHONE ENCOUNTER
Patient called stating that she would like an update about her bill. I told her that I would ask javier our coordinator to reach out to her about her bill. I have sent a message to javier to call the patient.

## 2022-05-09 ENCOUNTER — OFFICE VISIT (OUTPATIENT)
Dept: ORTHOPEDICS CLINIC | Facility: CLINIC | Age: 37
End: 2022-05-09
Payer: MEDICAID

## 2022-05-09 ENCOUNTER — HOSPITAL ENCOUNTER (OUTPATIENT)
Dept: GENERAL RADIOLOGY | Facility: HOSPITAL | Age: 37
Discharge: HOME | End: 2022-05-09
Attending: ORTHOPAEDIC SURGERY
Payer: MEDICAID

## 2022-05-09 DIAGNOSIS — M25.562 PAIN IN BOTH KNEES, UNSPECIFIED CHRONICITY: ICD-10-CM

## 2022-05-09 DIAGNOSIS — M22.2X2 BILATERAL PATELLOFEMORAL SYNDROME: Primary | ICD-10-CM

## 2022-05-09 DIAGNOSIS — M25.561 PAIN IN BOTH KNEES, UNSPECIFIED CHRONICITY: ICD-10-CM

## 2022-05-09 DIAGNOSIS — M22.2X1 BILATERAL PATELLOFEMORAL SYNDROME: Primary | ICD-10-CM

## 2022-05-09 PROCEDURE — 73564 X-RAY EXAM KNEE 4 OR MORE: CPT | Performed by: ORTHOPAEDIC SURGERY

## 2022-05-09 RX ORDER — MELOXICAM 15 MG/1
15 TABLET ORAL DAILY
Qty: 30 TABLET | Refills: 0 | Status: SHIPPED | OUTPATIENT
Start: 2022-05-09

## 2022-05-09 RX ORDER — ERGOCALCIFEROL 1.25 MG/1
CAPSULE ORAL
Qty: 12 CAPSULE | Refills: 0 | Status: SHIPPED | OUTPATIENT
Start: 2022-05-09

## 2022-05-11 ENCOUNTER — TELEPHONE (OUTPATIENT)
Dept: SURGERY | Facility: CLINIC | Age: 37
End: 2022-05-11

## 2022-05-11 NOTE — TELEPHONE ENCOUNTER
Patient called with questions regarding her incisions. She reports purulent drainage from the right breast T-Junction for the past week. A photo was requested. Patient will send via [employer].

## 2022-05-11 NOTE — TELEPHONE ENCOUNTER
Called pt to let her know that the phone calls she is receiving from the collections department is an error. Per charge capture, a manual review has been requested with [facility] as we have an auth on file for the denied procedure. Pt advised to not pay any amount at this time as it is pending a 3rd appeal. I also gave the pt my direct number should she have any other questions, I let her know I would follow up in a few weeks. Pt verbalized understanding and was appreciative of my call.

## 2022-05-23 ENCOUNTER — OFFICE VISIT (OUTPATIENT)
Dept: ENDOCRINOLOGY CLINIC | Facility: CLINIC | Age: 37
End: 2022-05-23
Payer: MEDICAID

## 2022-05-23 ENCOUNTER — PATIENT MESSAGE (OUTPATIENT)
Dept: ENDOCRINOLOGY CLINIC | Facility: CLINIC | Age: 37
End: 2022-05-23

## 2022-05-23 ENCOUNTER — LAB ENCOUNTER (OUTPATIENT)
Dept: LAB | Age: 37
End: 2022-05-23
Attending: INTERNAL MEDICINE
Payer: MEDICAID

## 2022-05-23 VITALS
BODY MASS INDEX: 35 KG/M2 | HEIGHT: 64 IN | WEIGHT: 205 LBS | RESPIRATION RATE: 16 BRPM | HEART RATE: 86 BPM | DIASTOLIC BLOOD PRESSURE: 91 MMHG | SYSTOLIC BLOOD PRESSURE: 128 MMHG

## 2022-05-23 DIAGNOSIS — E34.9 ELEVATED PARATHYROID HORMONE: ICD-10-CM

## 2022-05-23 DIAGNOSIS — E34.9 ELEVATED PARATHYROID HORMONE: Primary | ICD-10-CM

## 2022-05-23 DIAGNOSIS — E55.9 VITAMIN D DEFICIENCY: ICD-10-CM

## 2022-05-23 PROBLEM — R79.89 ELEVATED PARATHYROID HORMONE: Status: ACTIVE | Noted: 2022-05-23

## 2022-05-23 LAB
ALBUMIN SERPL-MCNC: 3.5 G/DL (ref 3.4–5)
ALBUMIN/GLOB SERPL: 0.9 {RATIO} (ref 1–2)
ALP LIVER SERPL-CCNC: 59 U/L
ALT SERPL-CCNC: 12 U/L
ANION GAP SERPL CALC-SCNC: 6 MMOL/L (ref 0–18)
AST SERPL-CCNC: 16 U/L (ref 15–37)
BILIRUB SERPL-MCNC: 0.3 MG/DL (ref 0.1–2)
BUN BLD-MCNC: 14 MG/DL (ref 7–18)
BUN/CREAT SERPL: 21.9 (ref 10–20)
CALCIUM BLD-MCNC: 9.1 MG/DL (ref 8.5–10.1)
CHLORIDE SERPL-SCNC: 107 MMOL/L (ref 98–112)
CO2 SERPL-SCNC: 27 MMOL/L (ref 21–32)
CREAT BLD-MCNC: 0.64 MG/DL
FASTING STATUS PATIENT QL REPORTED: YES
GLOBULIN PLAS-MCNC: 3.8 G/DL (ref 2.8–4.4)
GLUCOSE BLD-MCNC: 106 MG/DL (ref 70–99)
MAGNESIUM SERPL-MCNC: 2 MG/DL (ref 1.6–2.6)
OSMOLALITY SERPL CALC.SUM OF ELEC: 291 MOSM/KG (ref 275–295)
PHOSPHATE SERPL-MCNC: 2.7 MG/DL (ref 2.5–4.9)
POTASSIUM SERPL-SCNC: 4.1 MMOL/L (ref 3.5–5.1)
PROT SERPL-MCNC: 7.3 G/DL (ref 6.4–8.2)
PTH-INTACT SERPL-MCNC: 87.8 PG/ML (ref 18.5–88)
SODIUM SERPL-SCNC: 140 MMOL/L (ref 136–145)
VIT D+METAB SERPL-MCNC: 46.1 NG/ML (ref 30–100)

## 2022-05-23 PROCEDURE — 84100 ASSAY OF PHOSPHORUS: CPT | Performed by: INTERNAL MEDICINE

## 2022-05-23 PROCEDURE — 36415 COLL VENOUS BLD VENIPUNCTURE: CPT | Performed by: INTERNAL MEDICINE

## 2022-05-23 PROCEDURE — 82330 ASSAY OF CALCIUM: CPT

## 2022-05-23 PROCEDURE — 82306 VITAMIN D 25 HYDROXY: CPT

## 2022-05-23 PROCEDURE — 83735 ASSAY OF MAGNESIUM: CPT | Performed by: INTERNAL MEDICINE

## 2022-05-23 PROCEDURE — 83970 ASSAY OF PARATHORMONE: CPT | Performed by: INTERNAL MEDICINE

## 2022-05-23 PROCEDURE — 80053 COMPREHEN METABOLIC PANEL: CPT | Performed by: INTERNAL MEDICINE

## 2022-05-23 PROCEDURE — 84080 ASSAY ALKALINE PHOSPHATASES: CPT | Performed by: INTERNAL MEDICINE

## 2022-05-24 ENCOUNTER — PATIENT MESSAGE (OUTPATIENT)
Dept: ENDOCRINOLOGY CLINIC | Facility: CLINIC | Age: 37
End: 2022-05-24

## 2022-05-24 LAB
CALCIUM IONIZED PH 7.4: 1.28 MMOL/L
CALCIUM IONIZED, SERUM: 1.29 MMOL/L

## 2022-05-24 NOTE — TELEPHONE ENCOUNTER
From: Anjelica Reyes  To: Rashida Short MD  Sent: 5/23/2022 9:59 PM CDT  Subject: Question regarding PTH, INTACT    Hi , it is normal, or it should the number be less? What do you think? Do I have a problem?

## 2022-05-24 NOTE — TELEPHONE ENCOUNTER
Hi!    Please let her know that all of her blood tests that have come back so far are normal. Her blood sugar is high because she may not have been fasting. It does not mean anything. I will give my final recommendations once all of the blood tests have come back. Thank you!

## 2022-05-25 ENCOUNTER — PATIENT MESSAGE (OUTPATIENT)
Dept: ENDOCRINOLOGY CLINIC | Facility: CLINIC | Age: 37
End: 2022-05-25

## 2022-05-25 DIAGNOSIS — E55.9 VITAMIN D DEFICIENCY: ICD-10-CM

## 2022-05-25 DIAGNOSIS — N25.81 SECONDARY HYPERPARATHYROIDISM (HCC): Primary | ICD-10-CM

## 2022-05-25 NOTE — TELEPHONE ENCOUNTER
Hi!    Please ask her how many hours she was fasting? Please let her know that in many instances, if one has been eating healthy normally,  and then one eats a meal with lots of carbs as she has, you can observe an elevated fasting blood sugar. In 2/2022, her HbA1c was in the normal range. My recommendation would be to not worry about this too much and recheck it in a few weeks along with a repeat HbA1c. I can place the orders if she would like. Thank you.

## 2022-05-25 NOTE — TELEPHONE ENCOUNTER
Hi!    Please let the patient know that she had fasted long enough and I think that with one blood test we should not make a diagnosis that there is a problem. I can re-evaluate her in a week with a repeat blood test after she has fasted for 8-10 hours again if she would like. She does not have primary hyperparathyroidism. She had secondary hyperparathyroidism due to vitamin D deficiency most likely. I would like her to continue taking the high-dose ergocalciferol but every two weeks from now on. Please let her know that I would like to recheck everything once again in 3 months. Thank you!

## 2022-05-25 NOTE — TELEPHONE ENCOUNTER
From: Teodora Christa  Sent: 5/25/2022 10:23 AM CDT  To: Sridevi Daniels Clinical Staff  Subject: Question regarding PTH, INTACT    Hi, The last time I ate sugar at 12 am .   Depending on my test results is everything normal? Should I worry about anything? Is there any wrong in parathyroid gland ? Do you think it secreted more hormone because of vitamin D? Do I have to continue taking vitamin D 56230? If yes for how long ? If no what doses should I take for ever? I still have 8 capsules 03235.    Thank you

## 2022-05-26 LAB — BONE SPECIFIC ALKALINE PHOSPHATASE: 7.7 UG/L

## 2022-05-27 ENCOUNTER — OFFICE VISIT (OUTPATIENT)
Dept: SURGERY | Facility: CLINIC | Age: 37
End: 2022-05-27
Payer: MEDICAID

## 2022-05-27 DIAGNOSIS — Z98.890 S/P BILATERAL BREAST REDUCTION: Primary | ICD-10-CM

## 2022-05-27 NOTE — PROGRESS NOTES
Margaux Eric is a 40year old female who presents today for a follow-up. She has been applying topical biotic ointment to area of separation at her T-junction. Physical Examination:  Breasts: Bilateral breast incisions are well-healed. There are no open wounds noted. Disclamation of the nipple areolar complexes is noted. Assessment and Plan:  Patient is doing well. We discussed applying moisturizer to the nipple areolar complexes. We discussed scar care including massage moisturizer and silicone products. The patient may slowly resume regular activity with compression in place. She will follow-up in 3 months for reassessment. The plan was reviewed with the patient and questions were answered.

## 2022-05-31 ENCOUNTER — OFFICE VISIT (OUTPATIENT)
Dept: INTERNAL MEDICINE CLINIC | Facility: CLINIC | Age: 37
End: 2022-05-31
Payer: MEDICAID

## 2022-05-31 VITALS
DIASTOLIC BLOOD PRESSURE: 70 MMHG | WEIGHT: 191.63 LBS | HEART RATE: 88 BPM | SYSTOLIC BLOOD PRESSURE: 120 MMHG | BODY MASS INDEX: 32.71 KG/M2 | HEIGHT: 64 IN | OXYGEN SATURATION: 99 %

## 2022-05-31 DIAGNOSIS — Z98.890 S/P BILATERAL BREAST REDUCTION: ICD-10-CM

## 2022-05-31 DIAGNOSIS — M25.519 SHOULDER PAIN, UNSPECIFIED CHRONICITY, UNSPECIFIED LATERALITY: ICD-10-CM

## 2022-05-31 DIAGNOSIS — E21.3 HYPERPARATHYROIDISM (HCC): ICD-10-CM

## 2022-05-31 DIAGNOSIS — R10.9 ABDOMINAL PAIN, UNSPECIFIED ABDOMINAL LOCATION: Primary | ICD-10-CM

## 2022-05-31 DIAGNOSIS — E61.1 IRON DEFICIENCY: ICD-10-CM

## 2022-05-31 DIAGNOSIS — M25.569 KNEE PAIN, UNSPECIFIED CHRONICITY, UNSPECIFIED LATERALITY: ICD-10-CM

## 2022-05-31 LAB
DEPRECATED HBV CORE AB SER IA-ACNC: 14.4 NG/ML
IRON SATN MFR SERPL: 20 %
IRON SERPL-MCNC: 81 UG/DL
TIBC SERPL-MCNC: 396 UG/DL (ref 240–450)
TRANSFERRIN SERPL-MCNC: 266 MG/DL (ref 200–360)

## 2022-05-31 PROCEDURE — 83540 ASSAY OF IRON: CPT | Performed by: INTERNAL MEDICINE

## 2022-05-31 PROCEDURE — 84466 ASSAY OF TRANSFERRIN: CPT | Performed by: INTERNAL MEDICINE

## 2022-05-31 PROCEDURE — 3078F DIAST BP <80 MM HG: CPT | Performed by: INTERNAL MEDICINE

## 2022-05-31 PROCEDURE — 99215 OFFICE O/P EST HI 40 MIN: CPT | Performed by: INTERNAL MEDICINE

## 2022-05-31 PROCEDURE — 82728 ASSAY OF FERRITIN: CPT | Performed by: INTERNAL MEDICINE

## 2022-05-31 PROCEDURE — 3074F SYST BP LT 130 MM HG: CPT | Performed by: INTERNAL MEDICINE

## 2022-05-31 PROCEDURE — 3008F BODY MASS INDEX DOCD: CPT | Performed by: INTERNAL MEDICINE

## 2022-05-31 RX ORDER — AMOXICILLIN 500 MG/1
500 CAPSULE ORAL 2 TIMES DAILY
COMMUNITY
Start: 2022-05-19

## 2022-08-19 ENCOUNTER — OFFICE VISIT (OUTPATIENT)
Dept: SURGERY | Facility: CLINIC | Age: 37
End: 2022-08-19
Payer: MEDICAID

## 2022-08-19 DIAGNOSIS — Z98.890 S/P BILATERAL BREAST REDUCTION: Primary | ICD-10-CM

## 2022-08-19 PROCEDURE — 99213 OFFICE O/P EST LOW 20 MIN: CPT | Performed by: SURGERY

## 2022-08-19 NOTE — PROGRESS NOTES
William Raya is a 40year old female who presents today for a follow-up. She has been performing scar massage. She is pleased with the result. Physical Examination:  Breasts: Bilateral breast incisions are well-healed and flat. Slight prominence is noted at the superior areolar border bilaterally. Good shape and symmetry is noted. Assessment and Plan:  Patient is doing well. She is encouraged she begin using silicone strips. She may resume regular activity with compression in place. She will follow-up in 3 to 6 months for reassessment. Should the area of prominence persist, we discussed revision under local anesthesia in the office. The plan was reviewed with the patient and questions were answered. A total of 20 minutes was spent examining the patient and answering the patient's questions.

## 2022-12-02 ENCOUNTER — OFFICE VISIT (OUTPATIENT)
Dept: SURGERY | Facility: CLINIC | Age: 37
End: 2022-12-02
Payer: MEDICAID

## 2022-12-02 DIAGNOSIS — Z98.890 S/P BILATERAL BREAST REDUCTION: Primary | ICD-10-CM

## 2022-12-02 PROCEDURE — 99212 OFFICE O/P EST SF 10 MIN: CPT | Performed by: SURGERY

## 2022-12-02 RX ORDER — CLINDAMYCIN PHOSPHATE 10 MG/G
1 AEROSOL, FOAM TOPICAL 2 TIMES DAILY
Qty: 100 G | Refills: 0 | Status: SHIPPED | OUTPATIENT
Start: 2022-12-02

## 2022-12-02 NOTE — PROGRESS NOTES
Katy Welch is a 40year old female who presents today for a follow-up. She complains of mild intermittent nipple discomfort. Physical Examination:  Breasts: Breast incisions are well-healed. Good shape and symmetry is appreciated. The right nipple is noted to have multiple comedones without discharge. No palpable breast masses noted. Assessment and Plan:  Patient is doing well. She was instructed to continue scar treatments including massage and moisturizer and silicone products. The patient was given a prescription for clindamycin foam for her comedones. She will follow-up in 3 months for reassessment. The plan was reviewed with the patient and questions were answered.

## 2023-04-26 ENCOUNTER — OFFICE VISIT (OUTPATIENT)
Dept: INTERNAL MEDICINE CLINIC | Facility: CLINIC | Age: 38
End: 2023-04-26
Payer: MEDICAID

## 2023-04-26 VITALS
OXYGEN SATURATION: 98 % | WEIGHT: 199 LBS | HEART RATE: 70 BPM | SYSTOLIC BLOOD PRESSURE: 120 MMHG | BODY MASS INDEX: 33.97 KG/M2 | DIASTOLIC BLOOD PRESSURE: 88 MMHG | HEIGHT: 64 IN

## 2023-04-26 DIAGNOSIS — E21.3 HYPERPARATHYROIDISM (HCC): ICD-10-CM

## 2023-04-26 DIAGNOSIS — R10.12 LUQ PAIN: Primary | ICD-10-CM

## 2023-04-26 DIAGNOSIS — E55.9 VITAMIN D DEFICIENCY: ICD-10-CM

## 2023-04-26 DIAGNOSIS — J45.909 MILD ASTHMA WITHOUT COMPLICATION, UNSPECIFIED WHETHER PERSISTENT: ICD-10-CM

## 2023-04-26 DIAGNOSIS — R14.0 ABDOMINAL BLOATING: ICD-10-CM

## 2023-04-26 DIAGNOSIS — E61.1 IRON DEFICIENCY: ICD-10-CM

## 2023-04-26 DIAGNOSIS — E83.52 HYPERCALCEMIA: ICD-10-CM

## 2023-04-26 DIAGNOSIS — R82.90 ABNORMAL URINALYSIS: ICD-10-CM

## 2023-04-26 LAB
ALBUMIN SERPL-MCNC: 3.5 G/DL (ref 3.4–5)
ALBUMIN/GLOB SERPL: 0.9 {RATIO} (ref 1–2)
ALP LIVER SERPL-CCNC: 63 U/L
ALT SERPL-CCNC: 21 U/L
ANION GAP SERPL CALC-SCNC: 5 MMOL/L (ref 0–18)
APPEARANCE: CLEAR
AST SERPL-CCNC: 19 U/L (ref 15–37)
BASOPHILS # BLD AUTO: 0.04 X10(3) UL (ref 0–0.2)
BASOPHILS NFR BLD AUTO: 0.7 %
BILIRUB SERPL-MCNC: 0.3 MG/DL (ref 0.1–2)
BILIRUBIN: NEGATIVE
BUN BLD-MCNC: 21 MG/DL (ref 7–18)
BUN/CREAT SERPL: 31.3 (ref 10–20)
CALCIUM BLD-MCNC: 8.8 MG/DL (ref 8.5–10.1)
CALCIUM BLD-MCNC: 9.1 MG/DL (ref 8.5–10.1)
CHLORIDE SERPL-SCNC: 107 MMOL/L (ref 98–112)
CHOLEST SERPL-MCNC: 198 MG/DL (ref ?–200)
CO2 SERPL-SCNC: 27 MMOL/L (ref 21–32)
CONTROL LINE PRESENT WITH A CLEAR BACKGROUND (YES/NO): YES YES/NO
CREAT BLD-MCNC: 0.64 MG/DL
CREAT BLD-MCNC: 0.67 MG/DL
DEPRECATED RDW RBC AUTO: 40.2 FL (ref 35.1–46.3)
EOSINOPHIL # BLD AUTO: 0.15 X10(3) UL (ref 0–0.7)
EOSINOPHIL NFR BLD AUTO: 2.4 %
ERYTHROCYTE [DISTWIDTH] IN BLOOD BY AUTOMATED COUNT: 13 % (ref 11–15)
EST. AVERAGE GLUCOSE BLD GHB EST-MCNC: 105 MG/DL (ref 68–126)
FASTING PATIENT LIPID ANSWER: YES
FASTING STATUS PATIENT QL REPORTED: YES
GFR SERPLBLD BASED ON 1.73 SQ M-ARVRAT: 115 ML/MIN/1.73M2 (ref 60–?)
GLOBULIN PLAS-MCNC: 4.1 G/DL (ref 2.8–4.4)
GLUCOSE (URINE DIPSTICK): NEGATIVE MG/DL
GLUCOSE BLD-MCNC: 75 MG/DL (ref 70–99)
HBA1C MFR BLD: 5.3 % (ref ?–5.7)
HCT VFR BLD AUTO: 39.7 %
HDLC SERPL-MCNC: 68 MG/DL (ref 40–59)
HGB BLD-MCNC: 13 G/DL
IMM GRANULOCYTES # BLD AUTO: 0.02 X10(3) UL (ref 0–1)
IMM GRANULOCYTES NFR BLD: 0.3 %
KETONES (URINE DIPSTICK): NEGATIVE MG/DL
LDLC SERPL CALC-MCNC: 121 MG/DL (ref ?–100)
LIPASE SERPL-CCNC: 37 U/L (ref 13–75)
LYMPHOCYTES # BLD AUTO: 2.12 X10(3) UL (ref 1–4)
LYMPHOCYTES NFR BLD AUTO: 34.6 %
MCH RBC QN AUTO: 28 PG (ref 26–34)
MCHC RBC AUTO-ENTMCNC: 32.7 G/DL (ref 31–37)
MCV RBC AUTO: 85.6 FL
MONOCYTES # BLD AUTO: 0.27 X10(3) UL (ref 0.1–1)
MONOCYTES NFR BLD AUTO: 4.4 %
MULTISTIX LOT#: 789 NUMERIC
NEUTROPHILS # BLD AUTO: 3.53 X10 (3) UL (ref 1.5–7.7)
NEUTROPHILS # BLD AUTO: 3.53 X10(3) UL (ref 1.5–7.7)
NEUTROPHILS NFR BLD AUTO: 57.6 %
NITRITE, URINE: NEGATIVE
NONHDLC SERPL-MCNC: 130 MG/DL (ref ?–130)
OSMOLALITY SERPL CALC.SUM OF ELEC: 290 MOSM/KG (ref 275–295)
PH, URINE: 5.5 (ref 4.5–8)
PHOSPHATE SERPL-MCNC: 3.3 MG/DL (ref 2.5–4.9)
PLATELET # BLD AUTO: 329 10(3)UL (ref 150–450)
POTASSIUM SERPL-SCNC: 3.7 MMOL/L (ref 3.5–5.1)
PREGNANCY TEST, URINE: NEGATIVE
PROT SERPL-MCNC: 7.6 G/DL (ref 6.4–8.2)
PROTEIN (URINE DIPSTICK): NEGATIVE MG/DL
PTH-INTACT SERPL-MCNC: 79.4 PG/ML (ref 18.5–88)
RBC # BLD AUTO: 4.64 X10(6)UL
SODIUM SERPL-SCNC: 139 MMOL/L (ref 136–145)
SPECIFIC GRAVITY: 1.02 (ref 1–1.03)
TRIGL SERPL-MCNC: 48 MG/DL (ref 30–149)
TSI SER-ACNC: 0.99 MIU/ML (ref 0.36–3.74)
URINE-COLOR: YELLOW
UROBILINOGEN,SEMI-QN: 0.2 MG/DL (ref 0–1.9)
VIT D+METAB SERPL-MCNC: 14.4 NG/ML (ref 30–100)
VLDLC SERPL CALC-MCNC: 8 MG/DL (ref 0–30)
WBC # BLD AUTO: 6.1 X10(3) UL (ref 4–11)

## 2023-04-26 PROCEDURE — 83690 ASSAY OF LIPASE: CPT | Performed by: INTERNAL MEDICINE

## 2023-04-26 PROCEDURE — 84443 ASSAY THYROID STIM HORMONE: CPT | Performed by: INTERNAL MEDICINE

## 2023-04-26 PROCEDURE — 3074F SYST BP LT 130 MM HG: CPT | Performed by: INTERNAL MEDICINE

## 2023-04-26 PROCEDURE — 82310 ASSAY OF CALCIUM: CPT | Performed by: INTERNAL MEDICINE

## 2023-04-26 PROCEDURE — 83036 HEMOGLOBIN GLYCOSYLATED A1C: CPT | Performed by: INTERNAL MEDICINE

## 2023-04-26 PROCEDURE — 83970 ASSAY OF PARATHORMONE: CPT | Performed by: INTERNAL MEDICINE

## 2023-04-26 PROCEDURE — 82565 ASSAY OF CREATININE: CPT | Performed by: INTERNAL MEDICINE

## 2023-04-26 PROCEDURE — 3079F DIAST BP 80-89 MM HG: CPT | Performed by: INTERNAL MEDICINE

## 2023-04-26 PROCEDURE — 81025 URINE PREGNANCY TEST: CPT | Performed by: INTERNAL MEDICINE

## 2023-04-26 PROCEDURE — 3008F BODY MASS INDEX DOCD: CPT | Performed by: INTERNAL MEDICINE

## 2023-04-26 PROCEDURE — 82306 VITAMIN D 25 HYDROXY: CPT | Performed by: INTERNAL MEDICINE

## 2023-04-26 PROCEDURE — 80061 LIPID PANEL: CPT | Performed by: INTERNAL MEDICINE

## 2023-04-26 PROCEDURE — 99215 OFFICE O/P EST HI 40 MIN: CPT | Performed by: INTERNAL MEDICINE

## 2023-04-26 PROCEDURE — 80053 COMPREHEN METABOLIC PANEL: CPT | Performed by: INTERNAL MEDICINE

## 2023-04-26 PROCEDURE — 87186 SC STD MICRODIL/AGAR DIL: CPT | Performed by: INTERNAL MEDICINE

## 2023-04-26 PROCEDURE — 81003 URINALYSIS AUTO W/O SCOPE: CPT | Performed by: INTERNAL MEDICINE

## 2023-04-26 PROCEDURE — 87086 URINE CULTURE/COLONY COUNT: CPT | Performed by: INTERNAL MEDICINE

## 2023-04-26 PROCEDURE — 84100 ASSAY OF PHOSPHORUS: CPT | Performed by: INTERNAL MEDICINE

## 2023-04-26 PROCEDURE — 85025 COMPLETE CBC W/AUTO DIFF WBC: CPT | Performed by: INTERNAL MEDICINE

## 2023-04-26 PROCEDURE — 87077 CULTURE AEROBIC IDENTIFY: CPT | Performed by: INTERNAL MEDICINE

## 2023-04-26 RX ORDER — OSELTAMIVIR PHOSPHATE 75 MG/1
CAPSULE ORAL
COMMUNITY
Start: 2022-11-22

## 2023-04-26 RX ORDER — SULFAMETHOXAZOLE AND TRIMETHOPRIM 800; 160 MG/1; MG/1
1 TABLET ORAL 2 TIMES DAILY
Qty: 14 TABLET | Refills: 0 | Status: SHIPPED | OUTPATIENT
Start: 2023-04-26 | End: 2023-05-03

## 2023-04-26 RX ORDER — DICYCLOMINE HYDROCHLORIDE 10 MG/1
10 CAPSULE ORAL 3 TIMES DAILY PRN
Qty: 30 CAPSULE | Refills: 2 | Status: SHIPPED | OUTPATIENT
Start: 2023-04-26

## 2023-04-27 ENCOUNTER — APPOINTMENT (OUTPATIENT)
Dept: LAB | Facility: HOSPITAL | Age: 38
End: 2023-04-27
Attending: INTERNAL MEDICINE
Payer: MEDICAID

## 2023-04-27 PROCEDURE — 87338 HPYLORI STOOL AG IA: CPT

## 2023-04-28 ENCOUNTER — LAB ENCOUNTER (OUTPATIENT)
Dept: LAB | Facility: HOSPITAL | Age: 38
End: 2023-04-28
Attending: INTERNAL MEDICINE
Payer: MEDICAID

## 2023-04-28 DIAGNOSIS — J45.909 MILD ASTHMA WITHOUT COMPLICATION, UNSPECIFIED WHETHER PERSISTENT: ICD-10-CM

## 2023-04-28 DIAGNOSIS — R82.90 ABNORMAL URINALYSIS: ICD-10-CM

## 2023-04-28 DIAGNOSIS — R10.12 LUQ PAIN: ICD-10-CM

## 2023-04-28 DIAGNOSIS — E61.1 IRON DEFICIENCY: ICD-10-CM

## 2023-04-28 DIAGNOSIS — E55.9 VITAMIN D DEFICIENCY: ICD-10-CM

## 2023-04-28 DIAGNOSIS — R14.0 ABDOMINAL BLOATING: ICD-10-CM

## 2023-04-28 DIAGNOSIS — E21.3 HYPERPARATHYROIDISM (HCC): ICD-10-CM

## 2023-04-28 DIAGNOSIS — E83.52 HYPERCALCEMIA: ICD-10-CM

## 2023-05-01 LAB — H PYLORI AG STL QL IA: NEGATIVE

## 2023-05-01 RX ORDER — ERGOCALCIFEROL 1.25 MG/1
50000 CAPSULE ORAL WEEKLY
Qty: 12 CAPSULE | Refills: 1 | Status: SHIPPED | OUTPATIENT
Start: 2023-05-01 | End: 2023-07-18

## 2023-08-21 ENCOUNTER — OFFICE VISIT (OUTPATIENT)
Dept: INTERNAL MEDICINE CLINIC | Facility: CLINIC | Age: 38
End: 2023-08-21
Payer: MEDICAID

## 2023-08-21 VITALS
BODY MASS INDEX: 33.4 KG/M2 | HEIGHT: 64 IN | OXYGEN SATURATION: 99 % | WEIGHT: 195.63 LBS | DIASTOLIC BLOOD PRESSURE: 80 MMHG | SYSTOLIC BLOOD PRESSURE: 120 MMHG | HEART RATE: 70 BPM

## 2023-08-21 DIAGNOSIS — R39.9 URINARY SYMPTOM OR SIGN: ICD-10-CM

## 2023-08-21 DIAGNOSIS — J45.909 MILD ASTHMA WITHOUT COMPLICATION, UNSPECIFIED WHETHER PERSISTENT: ICD-10-CM

## 2023-08-21 DIAGNOSIS — Z00.00 ANNUAL PHYSICAL EXAM: Primary | ICD-10-CM

## 2023-08-21 DIAGNOSIS — B37.0 ORAL THRUSH: ICD-10-CM

## 2023-08-21 DIAGNOSIS — J45.901 EXACERBATION OF ASTHMA, UNSPECIFIED ASTHMA SEVERITY, UNSPECIFIED WHETHER PERSISTENT: ICD-10-CM

## 2023-08-21 DIAGNOSIS — Z98.890 S/P BILATERAL BREAST REDUCTION: ICD-10-CM

## 2023-08-21 PROBLEM — J11.1 INFLUENZA: Status: ACTIVE | Noted: 2023-08-21

## 2023-08-21 LAB
BILIRUB UR QL: NEGATIVE
CLARITY UR: CLEAR
GLUCOSE UR-MCNC: NORMAL MG/DL
HGB UR QL STRIP.AUTO: NEGATIVE
KETONES UR-MCNC: NEGATIVE MG/DL
LEUKOCYTE ESTERASE UR QL STRIP.AUTO: 75
NITRITE UR QL STRIP.AUTO: NEGATIVE
PH UR: 7 [PH] (ref 5–8)
PROT UR-MCNC: NEGATIVE MG/DL
SP GR UR STRIP: 1.01 (ref 1–1.03)
UROBILINOGEN UR STRIP-ACNC: NORMAL

## 2023-08-21 PROCEDURE — 87186 SC STD MICRODIL/AGAR DIL: CPT | Performed by: INTERNAL MEDICINE

## 2023-08-21 PROCEDURE — 90677 PCV20 VACCINE IM: CPT | Performed by: INTERNAL MEDICINE

## 2023-08-21 PROCEDURE — 3074F SYST BP LT 130 MM HG: CPT | Performed by: INTERNAL MEDICINE

## 2023-08-21 PROCEDURE — 81001 URINALYSIS AUTO W/SCOPE: CPT | Performed by: INTERNAL MEDICINE

## 2023-08-21 PROCEDURE — 3079F DIAST BP 80-89 MM HG: CPT | Performed by: INTERNAL MEDICINE

## 2023-08-21 PROCEDURE — 3008F BODY MASS INDEX DOCD: CPT | Performed by: INTERNAL MEDICINE

## 2023-08-21 PROCEDURE — 99395 PREV VISIT EST AGE 18-39: CPT | Performed by: INTERNAL MEDICINE

## 2023-08-21 PROCEDURE — 87086 URINE CULTURE/COLONY COUNT: CPT | Performed by: INTERNAL MEDICINE

## 2023-08-21 PROCEDURE — 87088 URINE BACTERIA CULTURE: CPT | Performed by: INTERNAL MEDICINE

## 2023-08-21 PROCEDURE — 90471 IMMUNIZATION ADMIN: CPT | Performed by: INTERNAL MEDICINE

## 2023-08-21 RX ORDER — ALBUTEROL SULFATE 90 UG/1
2 AEROSOL, METERED RESPIRATORY (INHALATION) EVERY 6 HOURS PRN
Qty: 2 EACH | Refills: 2 | Status: SHIPPED | OUTPATIENT
Start: 2023-08-21

## 2023-08-21 RX ORDER — CETIRIZINE HYDROCHLORIDE 10 MG/1
10 TABLET ORAL DAILY
Qty: 30 TABLET | Refills: 0 | Status: SHIPPED | OUTPATIENT
Start: 2023-08-21 | End: 2023-09-20

## 2023-08-21 RX ORDER — BUDESONIDE AND FORMOTEROL FUMARATE DIHYDRATE 160; 4.5 UG/1; UG/1
2 AEROSOL RESPIRATORY (INHALATION) 2 TIMES DAILY
Qty: 10.2 G | Refills: 0 | Status: SHIPPED | OUTPATIENT
Start: 2023-08-21 | End: 2023-11-19

## 2023-08-21 RX ORDER — FLUTICASONE PROPIONATE 50 MCG
2 SPRAY, SUSPENSION (ML) NASAL DAILY
Qty: 11.1 ML | Refills: 0 | Status: SHIPPED | OUTPATIENT
Start: 2023-08-21 | End: 2024-08-15

## 2023-08-23 RX ORDER — SULFAMETHOXAZOLE AND TRIMETHOPRIM 800; 160 MG/1; MG/1
1 TABLET ORAL 2 TIMES DAILY
Qty: 10 TABLET | Refills: 0 | Status: SHIPPED | OUTPATIENT
Start: 2023-08-23 | End: 2023-08-28

## 2023-09-08 RX ORDER — CEPHALEXIN 500 MG/1
500 CAPSULE ORAL 3 TIMES DAILY
Qty: 21 CAPSULE | Refills: 0 | Status: SHIPPED | OUTPATIENT
Start: 2023-09-08 | End: 2023-09-15

## 2023-09-24 DIAGNOSIS — Z98.890 S/P BILATERAL BREAST REDUCTION: ICD-10-CM

## 2023-09-24 DIAGNOSIS — Z00.00 ANNUAL PHYSICAL EXAM: ICD-10-CM

## 2023-09-24 DIAGNOSIS — J45.909 MILD ASTHMA WITHOUT COMPLICATION, UNSPECIFIED WHETHER PERSISTENT: ICD-10-CM

## 2023-09-24 DIAGNOSIS — R39.9 URINARY SYMPTOM OR SIGN: ICD-10-CM

## 2023-09-24 DIAGNOSIS — B37.0 ORAL THRUSH: ICD-10-CM

## 2023-09-24 DIAGNOSIS — J45.901 EXACERBATION OF ASTHMA, UNSPECIFIED ASTHMA SEVERITY, UNSPECIFIED WHETHER PERSISTENT: ICD-10-CM

## 2023-09-25 RX ORDER — FLUTICASONE PROPIONATE 50 MCG
2 SPRAY, SUSPENSION (ML) NASAL DAILY
Qty: 16 G | Refills: 0 | Status: SHIPPED | OUTPATIENT
Start: 2023-09-25

## 2023-10-04 DIAGNOSIS — R39.9 URINARY SYMPTOM OR SIGN: ICD-10-CM

## 2023-10-04 DIAGNOSIS — J45.901 EXACERBATION OF ASTHMA, UNSPECIFIED ASTHMA SEVERITY, UNSPECIFIED WHETHER PERSISTENT: ICD-10-CM

## 2023-10-04 DIAGNOSIS — Z98.890 S/P BILATERAL BREAST REDUCTION: ICD-10-CM

## 2023-10-04 DIAGNOSIS — B37.0 ORAL THRUSH: ICD-10-CM

## 2023-10-04 DIAGNOSIS — J45.909 MILD ASTHMA WITHOUT COMPLICATION, UNSPECIFIED WHETHER PERSISTENT: ICD-10-CM

## 2023-10-04 DIAGNOSIS — Z00.00 ANNUAL PHYSICAL EXAM: ICD-10-CM

## 2023-10-04 RX ORDER — FLUTICASONE PROPIONATE 50 MCG
2 SPRAY, SUSPENSION (ML) NASAL DAILY
Qty: 16 G | Refills: 0 | OUTPATIENT
Start: 2023-10-04

## 2023-10-06 DIAGNOSIS — J45.909 MILD ASTHMA WITHOUT COMPLICATION, UNSPECIFIED WHETHER PERSISTENT: ICD-10-CM

## 2023-10-06 DIAGNOSIS — R39.9 URINARY SYMPTOM OR SIGN: ICD-10-CM

## 2023-10-06 DIAGNOSIS — Z00.00 ANNUAL PHYSICAL EXAM: ICD-10-CM

## 2023-10-06 DIAGNOSIS — B37.0 ORAL THRUSH: ICD-10-CM

## 2023-10-06 DIAGNOSIS — J45.901 EXACERBATION OF ASTHMA, UNSPECIFIED ASTHMA SEVERITY, UNSPECIFIED WHETHER PERSISTENT: ICD-10-CM

## 2023-10-06 DIAGNOSIS — Z98.890 S/P BILATERAL BREAST REDUCTION: ICD-10-CM

## 2023-10-06 RX ORDER — BUDESONIDE AND FORMOTEROL FUMARATE DIHYDRATE 160; 4.5 UG/1; UG/1
2 AEROSOL RESPIRATORY (INHALATION) 2 TIMES DAILY
Qty: 10.2 G | Refills: 0 | Status: SHIPPED | OUTPATIENT
Start: 2023-10-06

## 2023-10-06 NOTE — TELEPHONE ENCOUNTER
A refill request was received for:  Requested Prescriptions     Pending Prescriptions Disp Refills    SYMBICORT 160-4.5 MCG/ACT Inhalation Aerosol [Pharmacy Med Name: Anabell Miller 160/4. 5MCG (120 ORAL INH)] 10.2 g 0     Sig: INHALE 2 PUFFS INTO THE LUNGS TWICE DAILY         Last office visit: 8/21/23      Future Appointments   Date Time Provider Luis Roman   2/20/2024 10:00 AM Terell Gilman MD [address]

## 2023-11-14 DIAGNOSIS — Z00.00 ANNUAL PHYSICAL EXAM: ICD-10-CM

## 2023-11-14 DIAGNOSIS — J45.901 EXACERBATION OF ASTHMA, UNSPECIFIED ASTHMA SEVERITY, UNSPECIFIED WHETHER PERSISTENT: ICD-10-CM

## 2023-11-14 DIAGNOSIS — B37.0 ORAL THRUSH: ICD-10-CM

## 2023-11-14 DIAGNOSIS — J45.909 MILD ASTHMA WITHOUT COMPLICATION, UNSPECIFIED WHETHER PERSISTENT: ICD-10-CM

## 2023-11-14 DIAGNOSIS — Z98.890 S/P BILATERAL BREAST REDUCTION: ICD-10-CM

## 2023-11-14 DIAGNOSIS — R39.9 URINARY SYMPTOM OR SIGN: ICD-10-CM

## 2023-11-14 RX ORDER — BUDESONIDE AND FORMOTEROL FUMARATE DIHYDRATE 160; 4.5 UG/1; UG/1
2 AEROSOL RESPIRATORY (INHALATION) 2 TIMES DAILY
Qty: 30.6 G | Refills: 0 | Status: SHIPPED | OUTPATIENT
Start: 2023-11-14

## 2023-11-14 NOTE — TELEPHONE ENCOUNTER
A refill request was received for:  Requested Prescriptions     Pending Prescriptions Disp Refills    BUDESONIDE-FORMOTEROL FUMARATE 160-4.5 MCG/ACT Inhalation Aerosol [Pharmacy Med Name: BUDESONIDE/FORM 160/4.5MCG(120 INH)] 30.6 g 0     Sig: INHALE 2 PUFFS INTO THE LUNGS TWICE DAILY     Last refill date:  10/6/23    Last office visit: 8/21/23      Future Appointments   Date Time Provider Luis Roman   2/20/2024 10:00 AM Taiwo Barger MD [place] [id number] [address]
show

## 2023-11-29 ENCOUNTER — OFFICE VISIT (OUTPATIENT)
Dept: INTERNAL MEDICINE CLINIC | Facility: CLINIC | Age: 38
End: 2023-11-29
Payer: MEDICAID

## 2023-11-29 VITALS
BODY MASS INDEX: 34.11 KG/M2 | DIASTOLIC BLOOD PRESSURE: 78 MMHG | TEMPERATURE: 97 F | OXYGEN SATURATION: 99 % | HEART RATE: 96 BPM | WEIGHT: 199.81 LBS | SYSTOLIC BLOOD PRESSURE: 112 MMHG | HEIGHT: 64 IN

## 2023-11-29 DIAGNOSIS — S46.811A STRAIN OF RIGHT DELTOID MUSCLE, INITIAL ENCOUNTER: ICD-10-CM

## 2023-11-29 DIAGNOSIS — M25.511 ACUTE PAIN OF RIGHT SHOULDER: Primary | ICD-10-CM

## 2023-11-29 PROCEDURE — 99213 OFFICE O/P EST LOW 20 MIN: CPT

## 2023-11-29 PROCEDURE — 3078F DIAST BP <80 MM HG: CPT

## 2023-11-29 PROCEDURE — 3074F SYST BP LT 130 MM HG: CPT

## 2023-11-29 PROCEDURE — 3008F BODY MASS INDEX DOCD: CPT

## 2023-11-29 RX ORDER — MELOXICAM 15 MG/1
15 TABLET ORAL DAILY
Qty: 30 TABLET | Refills: 0 | Status: SHIPPED | OUTPATIENT
Start: 2023-11-29 | End: 2023-12-29

## 2024-01-15 NOTE — PROGRESS NOTES
SHOULDER EVALUATION:     Diagnosis:   Acute pain of right shoulder (M25.511)       Referring Provider: Barak  Date of Evaluation:    1/16/2024    Precautions:  None Next MD visit:   none scheduled  Date of Surgery: n/a     PATIENT SUMMARY   Leilani Jackson is a 38 year old female who presents to therapy today with complaints of right shoulder pain, onset 10/17/23 while reaching for something. 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 Describes pain as sharp, denies radiating symptoms. Was given prednisone at  which provided momentary relief.   Now, pt said the pain is so much worse- especially reaching backward. Is unable to sleep on the right side. Ibuprofen also helps quite a bit.   She wants to start trying to have a baby, so she has to be careful with which medications she takes a certain times.     Pt describes pain level current 4-5/10, at best 4/10, at worst 8/10.   Location: points to intertubercular groove and lateral arm  Current functional limitations include reaching backward, sleeping on right side, lifting arm to wash hair in the shower, and most movements with right arm.     Leilani describes prior level of function [facility] at baker, has 1 daughter, more sedentary, was going to gym. Pt goals include reduce pain and get back to gym.  Past medical history was reviewed with Leilani. Significant findings include  has a past medical history of Asthma, COVID (10/23/2020), and motion sickness.     ASSESSMENT  Leilani presents to physical therapy evaluation with primary c/o right shoulder pain. The results of the objective tests and measures show painful ROM and strength deficits mostly limited by pain. Positive RC pathology, impingement, and biceps tendinitis special tests noted below.  Functional deficits include but are not limited to reaching backward, sleeping on right side, lifting arm to wash hair in the shower, and most movements with right arm.  Signs and symptoms are  consistent with diagnosis of rotator cuff and biceps tendinitis. Pt and PT discussed evaluation findings, pathology, POC and HEP.  Pt voiced understanding and performs HEP correctly without reported pain. Skilled Physical Therapy is medically necessary to address the above impairments and reach functional goals.     OBJECTIVE:   Observation/Posture: rounded shoulders posture  Palpation: tenderness along biceps tendon and supraspinatus tendon  Sensation: denies  Cervical Screen: WNL    AROM: (* denotes performed with pain)  Gross shoulder ROM: WFL  - pain noted with shoulder ABD R, and functional ER and IR on R    Flexibility: tight UT, LS, and pecs    Strength/MMT: (* denotes performed with pain)  Shoulder Elbow Scapular   Flexion: R 4*/5; L 4+/5  Abduction: R 3*/5; L 5-/5  ER: R 4*/5; L 4+/5  IR: R 4-*/5; L 4+/5 Flexion: R 5/5; L 5/5   Rhomboids: RNT/5, L NT/5  Mid trap: R NT/5; L NT/5   Lats: R NT/5, L NT/5  Low trap: R NT/5; L NT/5     Special tests:     Subacromial Impingement:  Painful arc: R +; L -  Chandler Jeff: R -; L -  Neer: R -; L -  Empty can: R +; L -    RC pathology:  Drop arm: R -; L -  Able to but feels weak  External rotation lag: R +; L -  Uncomfortable  Lift off: R -  Hornblower sign: R +; L -  Uncomfortable    AC joint:  Horizontal adduction sign: R +; L -    SLAP/Biceps tendonitis  James (bicipital tendonitis/SLAP): R +  Speeds (bicipital tendonitis/SLAP): R +    Today’s Treatment and Response:   Pt education was provided on exam findings, treatment diagnosis, treatment plan, expectations, and prognosis. Pt was also provided recommendations for activity modifications, possible soreness after evaluation, postural corrections, and importance of remaining active.  Patient was instructed in and issued a HEP for:   - Seated Scapular Retraction  - 5-6 x daily - 7 x weekly - 10 reps  - Doorway Pec Stretch at 90 Degrees Abduction  - 2-3 x daily - 7 x weekly - 10 reps - 10 sec hold    Charges: PT  Eval Low Complexity, 1 TE      Total Timed Treatment: 15 min     Total Treatment Time: 40 min     Based on clinical rationale and outcome measures, this evaluation involved Low Complexity decision making due to 1-2 personal factors/comorbidities, 3 body structures involved/activity limitations, and evolving symptoms including changing pain levels.  PLAN OF CARE:    Goals: (to be met in 8-10 visits)   Pt will report improved ability to sleep without waking due to shoulder pain   Pt will improve pain free shoulder flexion AROM to be able to reach into overhead cabinets without pain or restriction   Pt will improve pain free shoulder abduction AROM to improve ability to don deodorant, don/doff shirts, and wash hair   Pt will increase pain free shoulder AROM ER to be able to reach and fasten seatbelt   Pt will increase pain free shoulder AROM IR to be able to reach in back pocket, tuck in shirt, and turn steering wheel without pain  Pt will improve shoulder strength throughout to 4+/5 to improve function with baking   Pt will demonstrate increased mid/low trap strength to 4+/5 to promote improved shoulder mechanics and stabilization with lifting and reaching   Pt will be independent and compliant with comprehensive HEP to maintain progress achieved in PT      Frequency / Duration: Patient will be seen for 1-2 x/week or a total of 8-10 visits over a 90 day period. Treatment will include: Manual Therapy, Neuromuscular Re-education, Therapeutic Activities, Therapeutic Exercise, and Home Exercise Program instruction    Education or treatment limitation: None  Rehab Potential:excellent    QuickDASH Outcome Score  Score: 56.82 % (1/16/2024 12:39 PM)      Patient/Family/Caregiver was advised of these findings, precautions, and treatment options and has agreed to actively participate in planning and for this course of care.    Thank you for your referral. Please co-sign or sign and return this letter via fax as soon as possible  to [phone]. If you have any questions, please contact me at Dept: [phone]    Sincerely,  Electronically signed by therapist: Lj Alexis PT  Physician's certification required: Yes  I certify the need for these services furnished under this plan of treatment and while under my care.    X___________________________________________________ Date____________________    Certification From: 1/15/2024  To:4/14/2024

## 2024-01-16 ENCOUNTER — OFFICE VISIT (OUTPATIENT)
Dept: PHYSICAL THERAPY | Facility: HOSPITAL | Age: 39
End: 2024-01-16
Payer: MEDICAID

## 2024-01-16 DIAGNOSIS — M25.511 ACUTE PAIN OF RIGHT SHOULDER: Primary | ICD-10-CM

## 2024-01-16 PROCEDURE — 97110 THERAPEUTIC EXERCISES: CPT

## 2024-01-16 PROCEDURE — 97161 PT EVAL LOW COMPLEX 20 MIN: CPT

## 2024-01-18 NOTE — PROGRESS NOTES
Diagnosis:   Acute pain of right shoulder (M25.511)       Referring Provider: Barak  Date of Evaluation:    1/16/2024    Precautions:  None Next MD visit:   none scheduled  Date of Surgery: n/a     Insurance Primary/Secondary: BL[employer]OSS MEDICAID / N/A     # Auth Visits: 11            Subjective: Pt said she feels better since she was here, but pain is different. Pain still ongoing down the arm.  7:47    Pain: 4-5/10      Objective: pain is reduced with right arm supinated (thumb up)    ----------    Observation/Posture: rounded shoulders posture  Palpation: tenderness along biceps tendon and supraspinatus tendon  Sensation: denies  Cervical Screen: WNL    AROM: (* denotes performed with pain)  Gross shoulder ROM: WFL  - pain noted with shoulder ABD R, and functional ER and IR on R    Flexibility: tight UT, LS, and pecs    Strength/MMT: (* denotes performed with pain)  Shoulder Elbow Scapular   Flexion: R 4*/5; L 4+/5  Abduction: R 3*/5; L 5-/5  ER: R 4*/5; L 4+/5  IR: R 4-*/5; L 4+/5 Flexion: R 5/5; L 5/5   Rhomboids: RNT/5, L NT/5  Mid trap: R NT/5; L NT/5   Lats: R NT/5, L NT/5  Low trap: R NT/5; L NT/5     Special tests:     Subacromial Impingement:  Painful arc: R +; L -  Chandler Jeff: R -; L -  Neer: R -; L -  Empty can: R +; L -    RC pathology:  Drop arm: R -; L -  Able to but feels weak  External rotation lag: R +; L -  Uncomfortable  Lift off: R -  Hornblower sign: R +; L -  Uncomfortable    AC joint:  Horizontal adduction sign: R +; L -    SLAP/Biceps tendonitis  James: R +  Speeds: R +      Assessment: Pt arrives 17 minutes late to her appointment, therefore session was limited. Symptoms have improved, though she has ongoing pain in the lateral arm. Will update HEP next session.       Goals:   Goals: (to be met in 8-10 visits)   Pt will report improved ability to sleep without waking due to shoulder pain   Pt will improve pain free shoulder flexion AROM to be able to reach into overhead cabinets  without pain or restriction   Pt will improve pain free shoulder abduction AROM to improve ability to don deodorant, don/doff shirts, and wash hair   Pt will increase pain free shoulder AROM ER to be able to reach and fasten seatbelt   Pt will increase pain free shoulder AROM IR to be able to reach in back pocket, tuck in shirt, and turn steering wheel without pain  Pt will improve shoulder strength throughout to 4+/5 to improve function with baking   Pt will demonstrate increased mid/low trap strength to 4+/5 to promote improved shoulder mechanics and stabilization with lifting and reaching   Pt will be independent and compliant with comprehensive HEP to maintain progress achieved in PT      Plan: Patient will be seen for 1-2 x/week or a total of 8-10 visits over a 90 day period. Treatment will include: Manual Therapy, Neuromuscular Re-education, Therapeutic Activities, Therapeutic Exercise, and Home Exercise Program instruction  Date: 1/18/2024  TX#: 2/8-10 Date:                 TX#: 3/ Date:                 TX#: 4/ Date:                 TX#: 5/ Date:   Tx#: 6/   TE 16'  Seated SB rolls fwd and lat x3'  UT and LS stretching 3x15\" B  Supine shoulder flexion with dowel 2x10 (supinated causes less pain)  HL claps 2x10  Wall pushup 2x10  Supinated bicep curl 3# 2x10       NR 12'  HL serratus punch with dowel 2x10  GH row GTB 2x10  GH ext YTB 2x10  Bilat shoulder ER YTB x20 (breaks between every few reps)                     HEP:   - Seated Scapular Retraction  - 5-6 x daily - 7 x weekly - 10 reps  - Doorway Pec Stretch at 90 Degrees Abduction  - 2-3 x daily - 7 x weekly - 10 reps - 10 sec hold    Charges: 1 TE 1 NR       Total Timed Treatment: 28 min  Total Treatment Time: 28 min

## 2024-01-22 ENCOUNTER — OFFICE VISIT (OUTPATIENT)
Dept: PHYSICAL THERAPY | Facility: HOSPITAL | Age: 39
End: 2024-01-22
Payer: MEDICAID

## 2024-01-22 PROCEDURE — 97112 NEUROMUSCULAR REEDUCATION: CPT

## 2024-01-22 PROCEDURE — 97110 THERAPEUTIC EXERCISES: CPT

## 2024-01-22 NOTE — PROGRESS NOTES
Diagnosis:   Acute pain of right shoulder (M25.511)       Referring Provider: Barak  Date of Evaluation:    1/16/2024    Precautions:  None Next MD visit:   none scheduled  Date of Surgery: n/a     Insurance Primary/Secondary: BL[employer] CROSS MEDICAID / N/A     # Auth Visits: 11            Subjective: Pt said shoulder feels better, notices more discomfort down the arm now.     Pain: 3/10      Objective: pain is reduced with right arm supinated (thumb up)    ----------    Observation/Posture: rounded shoulders posture  Palpation: tenderness along biceps tendon and supraspinatus tendon  Sensation: denies  Cervical Screen: WNL    AROM: (* denotes performed with pain)  Gross shoulder ROM: WFL  - pain noted with shoulder ABD R, and functional ER and IR on R    Flexibility: tight UT, LS, and pecs    Strength/MMT: (* denotes performed with pain)  Shoulder Elbow Scapular   Flexion: R 4*/5; L 4+/5  Abduction: R 3*/5; L 5-/5  ER: R 4*/5; L 4+/5  IR: R 4-*/5; L 4+/5 Flexion: R 5/5; L 5/5   Rhomboids: RNT/5, L NT/5  Mid trap: R NT/5; L NT/5   Lats: R NT/5, L NT/5  Low trap: R NT/5; L NT/5     Special tests:     Subacromial Impingement:  Painful arc: R +; L -  Chandler Jeff: R -; L -  Neer: R -; L -  Empty can: R +; L -    RC pathology:  Drop arm: R -; L -  Able to but feels weak  External rotation lag: R +; L -  Uncomfortable  Lift off: R -  Hornblower sign: R +; L -  Uncomfortable    AC joint:  Horizontal adduction sign: R +; L -    SLAP/Biceps tendonitis  James: R +  Speeds: R +      Assessment: Pt was able to complete all planned exercises without complaints- most difficult with bilateral external rotation and low trap raise up the wall due to weakness. Noted UT weakness       Goals:   Goals: (to be met in 8-10 visits)   Pt will report improved ability to sleep without waking due to shoulder pain   Pt will improve pain free shoulder flexion AROM to be able to reach into overhead cabinets without pain or restriction   Pt  will improve pain free shoulder abduction AROM to improve ability to don deodorant, don/doff shirts, and wash hair   Pt will increase pain free shoulder AROM ER to be able to reach and fasten seatbelt   Pt will increase pain free shoulder AROM IR to be able to reach in back pocket, tuck in shirt, and turn steering wheel without pain  Pt will improve shoulder strength throughout to 4+/5 to improve function with baking   Pt will demonstrate increased mid/low trap strength to 4+/5 to promote improved shoulder mechanics and stabilization with lifting and reaching   Pt will be independent and compliant with comprehensive HEP to maintain progress achieved in PT      Plan: Patient will be seen for 1-2 x/week or a total of 8-10 visits over a 90 day period. Treatment will include: Manual Therapy, Neuromuscular Re-education, Therapeutic Activities, Therapeutic Exercise, and Home Exercise Program instruction  Date: 1/22/2024  TX#: 2/8-10 Date:  1/24/24               TX#: 3/8-10 Date:                 TX#: 4/ Date:                 TX#: 5/ Date:   Tx#: 6/   TE 16'  Seated SB rolls fwd and lat x3'  UT and LS stretching 3x15\" B  Supine shoulder flexion with dowel 2x10 (supinated causes less pain)  HL claps 2x10  Wall pushup 2x10  Supinated bicep curl 3# 2x10 TE 18'  Seated SB rolls fwd and lat x3'  Supine shoulder flexion with dowel 2# 2x10   HL swimmers x20  HL claps 2x10  Supinated bicep curl in HL 3# 2x10  Seated thoracic ext over SB 2x10   Standing open book stretch x10 B  Wall pushup with plus 2x10      NR 12'  HL serratus punch with dowel 2x10  GH row GTB 2x10  GH ext YTB 2x10  Bilat shoulder ER YTB x20 (breaks between every few reps) NR 15'  HL serratus punch with dowel 2# 2x10  GH row GTB 2x10  GH ext RTB 2x10  HL bilat shoulder horiz abd YTB 2x10  HL bilat shoulder ER YTB x20   Low trap raise 2x10        MT 8'  STM along pecs, biceps tendon, and parascap mm             HEP:   - Seated Scapular Retraction  - 5-6 x daily - 7  x weekly - 10 reps  - Doorway Pec Stretch at 90 Degrees Abduction  - 2-3 x daily - 7 x weekly - 10 reps - 10 sec hold    Charges: 1 TE 1 NR 1 MT      Total Timed Treatment: 41 min  Total Treatment Time: 41 min

## 2024-01-24 ENCOUNTER — OFFICE VISIT (OUTPATIENT)
Dept: PHYSICAL THERAPY | Facility: HOSPITAL | Age: 39
End: 2024-01-24
Payer: MEDICAID

## 2024-01-24 PROCEDURE — 97110 THERAPEUTIC EXERCISES: CPT

## 2024-01-24 PROCEDURE — 97140 MANUAL THERAPY 1/> REGIONS: CPT

## 2024-01-24 PROCEDURE — 97112 NEUROMUSCULAR REEDUCATION: CPT

## 2024-01-24 NOTE — PROGRESS NOTES
Diagnosis:   Acute pain of right shoulder (M25.511)       Referring Provider: Barak  Date of Evaluation:    1/16/2024    Precautions:  None Next MD visit:   none scheduled  Date of Surgery: n/a     Insurance Primary/Secondary: BLUE CROSS MEDICAID / N/A     # Auth Visits: 11            Subjective: Pt said the shoulder overall feels better, ongoing pain near the biceps.     Pain: 2/10      Objective:  no new complaints during exercises today    ----------    Observation/Posture: rounded shoulders posture  Palpation: tenderness along biceps tendon and supraspinatus tendon  Sensation: denies  Cervical Screen: WNL    AROM: (* denotes performed with pain)  Gross shoulder ROM: WFL  - pain noted with shoulder ABD R, and functional ER and IR on R    Flexibility: tight UT, LS, and pecs    Strength/MMT: (* denotes performed with pain)  Shoulder Elbow Scapular   Flexion: R 4*/5; L 4+/5  Abduction: R 3*/5; L 5-/5  ER: R 4*/5; L 4+/5  IR: R 4-*/5; L 4+/5 Flexion: R 5/5; L 5/5   Rhomboids: RNT/5, L NT/5  Mid trap: R NT/5; L NT/5   Lats: R NT/5, L NT/5  Low trap: R NT/5; L NT/5     Special tests:     Subacromial Impingement:  Painful arc: R +; L -  Chandler Jeff: R -; L -  Neer: R -; L -  Empty can: R +; L -    RC pathology:  Drop arm: R -; L -  Able to but feels weak  External rotation lag: R +; L -  Uncomfortable  Lift off: R -  Hornblower sign: R +; L -  Uncomfortable    AC joint:  Horizontal adduction sign: R +; L -    SLAP/Biceps tendonitis  Yergason: R +  Speeds: R +      Assessment: Pt responded well to new exercises. Did not have as much difficulty with external rotation. Updated HEP to include scapular strengthening exercises. Will continue to progress as able.       Goals:   Goals: (to be met in 8-10 visits)   Pt will report improved ability to sleep without waking due to shoulder pain   Pt will improve pain free shoulder flexion AROM to be able to reach into overhead cabinets without pain or restriction   Pt will  improve pain free shoulder abduction AROM to improve ability to don deodorant, don/doff shirts, and wash hair   Pt will increase pain free shoulder AROM ER to be able to reach and fasten seatbelt   Pt will increase pain free shoulder AROM IR to be able to reach in back pocket, tuck in shirt, and turn steering wheel without pain  Pt will improve shoulder strength throughout to 4+/5 to improve function with baking   Pt will demonstrate increased mid/low trap strength to 4+/5 to promote improved shoulder mechanics and stabilization with lifting and reaching   Pt will be independent and compliant with comprehensive HEP to maintain progress achieved in PT      Plan: Patient will be seen for 1-2 x/week or a total of 8-10 visits over a 90 day period. Treatment will include: Manual Therapy, Neuromuscular Re-education, Therapeutic Activities, Therapeutic Exercise, and Home Exercise Program instruction  Date: 1/22/2024  TX#: 2/8-10 Date:  1/24/24               TX#: 3/8-10 Date:  1/29/24               TX#: 4/8-10 Date:                 TX#: 5/ Date:   Tx#: 6/   TE 16'  Seated SB rolls fwd and lat x3'  UT and LS stretching 3x15\" B  Supine shoulder flexion with dowel 2x10 (supinated causes less pain)  HL claps 2x10  Wall pushup 2x10  Supinated bicep curl 3# 2x10 TE 18'  Seated SB rolls fwd and lat x3'  Supine shoulder flexion with dowel 2# 2x10   HL swimmers x20  HL claps 2x10  Supinated bicep curl in HL 3# 2x10  Seated thoracic ext over SB 2x10   Standing open book stretch x10 B  Wall pushup with plus 2x10 TE 17'  Seated thoracic ext over ball 2x10   Standing open book stretch x10 B  Wall pushup with plus 2x10  SB up the wall x10  Standing IYT 1# x10  Update HEP     NR 12'  HL serratus punch with dowel 2x10  GH row GTB 2x10  GH ext YTB 2x10  Bilat shoulder ER YTB x20 (breaks between every few reps) NR 15'  HL serratus punch with dowel 2# 2x10  GH row GTB 2x10  GH ext RTB 2x10  HL bilat shoulder horiz abd YTB 2x10  HL bilat  shoulder ER YTB x20   Low trap raise 2x10  NR 15'  GH row GTB x30  GH ext GTB x20  Standing bilat shoulder horiz abd YTB 2x10  Standing bilat shoulder ER YTB 2x10   ER isos walkout x8 YTB  IR isos walkout x8 YTB  Bent over gh ext x10, 1# x10 R  Bent over gh horiz abd x10, 1# x10 R  Bent over low trap raise x10, 1 # x10 R       MT 8'  STM along pecs, biceps tendon, and parascap mm MT 10'  STM along pecs, biceps tendon, and parascap mm            HEP:   - Seated Scapular Retraction  - 5-6 x daily - 7 x weekly - 10 reps  - Doorway Pec Stretch at 90 Degrees Abduction  - 2-3 x daily - 7 x weekly - 10 reps - 10 sec hold    Charges: 1 TE 1 NR 1 MT      Total Timed Treatment: 42 min  Total Treatment Time: 42 min

## 2024-01-29 ENCOUNTER — OFFICE VISIT (OUTPATIENT)
Dept: PHYSICAL THERAPY | Facility: HOSPITAL | Age: 39
End: 2024-01-29
Payer: MEDICAID

## 2024-01-29 PROCEDURE — 97112 NEUROMUSCULAR REEDUCATION: CPT

## 2024-01-29 PROCEDURE — 97110 THERAPEUTIC EXERCISES: CPT

## 2024-01-29 PROCEDURE — 97140 MANUAL THERAPY 1/> REGIONS: CPT

## 2024-01-31 NOTE — PROGRESS NOTES
Diagnosis:   Acute pain of right shoulder (M25.511)       Referring Provider: Barak  Date of Evaluation:    1/16/2024    Precautions:  None Next MD visit:   none scheduled  Date of Surgery: n/a     Insurance Primary/Secondary: BLUE CROSS MEDICAID / N/A     # Auth Visits: 11            Subjective: Pt said the pain came back after last session. Says the     Pain: 4/10      Objective:  just fatigued today and felt stretch, no pain    ----------    Observation/Posture: rounded shoulders posture  Palpation: tenderness along biceps tendon and supraspinatus tendon  Sensation: denies  Cervical Screen: WNL    AROM: (* denotes performed with pain)  Gross shoulder ROM: WFL  - pain noted with shoulder ABD R, and functional ER and IR on R    Flexibility: tight UT, LS, and pecs    Strength/MMT: (* denotes performed with pain)  Shoulder Elbow Scapular   Flexion: R 4*/5; L 4+/5  Abduction: R 3*/5; L 5-/5  ER: R 4*/5; L 4+/5  IR: R 4-*/5; L 4+/5 Flexion: R 5/5; L 5/5   Rhomboids: RNT/5, L NT/5  Mid trap: R NT/5; L NT/5   Lats: R NT/5, L NT/5  Low trap: R NT/5; L NT/5     Special tests:     Subacromial Impingement:  Painful arc: R +; L -  Chandler Jeff: R -; L -  Neer: R -; L -  Empty can: R +; L -    RC pathology:  Drop arm: R -; L -  Able to but feels weak  External rotation lag: R +; L -  Uncomfortable  Lift off: R -  Hornblower sign: R +; L -  Uncomfortable    AC joint:  Horizontal adduction sign: R +; L -    SLAP/Biceps tendonitis  Yererendira: R +  Speeds: R +      Assessment: Pt was very sore after last session, therefore held exercises with weight and focused on gentle scapular strengthening again. Discussed difference between aching/sore vs sharp/stabbing pains, and that fatigue and feeling sore is not detrimental to the progress.       Goals:   Goals: (to be met in 8-10 visits)   Pt will report improved ability to sleep without waking due to shoulder pain   Pt will improve pain free shoulder flexion AROM to be able to  reach into overhead cabinets without pain or restriction   Pt will improve pain free shoulder abduction AROM to improve ability to don deodorant, don/doff shirts, and wash hair   Pt will increase pain free shoulder AROM ER to be able to reach and fasten seatbelt   Pt will increase pain free shoulder AROM IR to be able to reach in back pocket, tuck in shirt, and turn steering wheel without pain  Pt will improve shoulder strength throughout to 4+/5 to improve function with baking   Pt will demonstrate increased mid/low trap strength to 4+/5 to promote improved shoulder mechanics and stabilization with lifting and reaching   Pt will be independent and compliant with comprehensive HEP to maintain progress achieved in PT      Plan: Patient will be seen for 1-2 x/week or a total of 8-10 visits over a 90 day period. Treatment will include: Manual Therapy, Neuromuscular Re-education, Therapeutic Activities, Therapeutic Exercise, and Home Exercise Program instruction  Date: 1/22/2024  TX#: 2/8-10 Date:  1/24/24               TX#: 3/8-10 Date:  1/29/24               TX#: 4/8-10 Date:  2/1/24               TX#: 5/8-10 Date:   Tx#: 6/   TE 16'  Seated SB rolls fwd and lat x3'  UT and LS stretching 3x15\" B  Supine shoulder flexion with dowel 2x10 (supinated causes less pain)  HL claps 2x10  Wall pushup 2x10  Supinated bicep curl 3# 2x10 TE 18'  Seated SB rolls fwd and lat x3'  Supine shoulder flexion with dowel 2# 2x10   HL swimmers x20  HL claps 2x10  Supinated bicep curl in HL 3# 2x10  Seated thoracic ext over SB 2x10   Standing open book stretch x10 B  Wall pushup with plus 2x10 TE 17'  Seated thoracic ext over ball 2x10   Standing open book stretch x10 B  Wall pushup with plus 2x10  SB up the wall x10  Standing IYT 1# x10  Update HEP TE 15'  SL open book stretch x10 B  Supine bicep curls 3# 2x10 R  Seated thoracic ext over ball 2x10   Wall pushup with plus 2x10  Doorway pec stretch 4x10\"     NR 12'  HL serratus punch with  dowel 2x10  GH row GTB 2x10  GH ext YTB 2x10  Bilat shoulder ER YTB x20 (breaks between every few reps) NR 15'  HL serratus punch with dowel 2# 2x10  GH row GTB 2x10  GH ext RTB 2x10  HL bilat shoulder horiz abd YTB 2x10  HL bilat shoulder ER YTB x20   Low trap raise 2x10  NR 15'  GH row GTB x30  GH ext GTB x20  Standing bilat shoulder horiz abd YTB 2x10  Standing bilat shoulder ER YTB 2x10   ER isos walkout x8 YTB  IR isos walkout x8 YTB  Bent over gh ext x10, 1# x10 R  Bent over gh horiz abd x10, 1# x10 R  Bent over low trap raise x10, 1 # x10 R  NR 17'  GH row GTB x30  GH ext GTB x30  Standing bilat shoulder horiz abd YTB 2x10  Standing bilat shoulder ER YTB 2x10   ER isos walkout 2x8 YTB  IR isos walkout 2x8 YTB     MT 8'  STM along pecs, biceps tendon, and parascap mm MT 10'  STM along pecs, biceps tendon, and parascap mm MT 10'  STM along pecs, biceps tendon, and parascap mm R           HEP:   - Seated Scapular Retraction  - 5-6 x daily - 7 x weekly - 10 reps  - Doorway Pec Stretch at 90 Degrees Abduction  - 2-3 x daily - 7 x weekly - 10 reps - 10 sec hold    Charges: 1 TE 1 NR 1 MT      Total Timed Treatment: 42 min  Total Treatment Time: 42 min

## 2024-02-01 ENCOUNTER — OFFICE VISIT (OUTPATIENT)
Dept: PHYSICAL THERAPY | Facility: HOSPITAL | Age: 39
End: 2024-02-01
Payer: MEDICAID

## 2024-02-01 PROCEDURE — 97112 NEUROMUSCULAR REEDUCATION: CPT

## 2024-02-01 PROCEDURE — 97140 MANUAL THERAPY 1/> REGIONS: CPT

## 2024-02-01 PROCEDURE — 97110 THERAPEUTIC EXERCISES: CPT

## 2024-02-02 NOTE — PROGRESS NOTES
Diagnosis:   Acute pain of right shoulder (M25.511)       Referring Provider: Barak  Date of Evaluation:    1/16/2024    Precautions:  None Next MD visit:   none scheduled  Date of Surgery: n/a     Insurance Primary/Secondary: BLUE CROSS MEDICAID / N/A     # Auth Visits: 11            Subjective: Pt said the shoulder feels good. Still has pain 90% of the time, but it's less.     Pain: 2/10      Objective:  + ULTT radial and median    ----------    Observation/Posture: rounded shoulders posture  Palpation: tenderness along biceps tendon and supraspinatus tendon  Sensation: denies  Cervical Screen: WNL    AROM: (* denotes performed with pain)  Gross shoulder ROM: WFL  - pain noted with shoulder ABD R, and functional ER and IR on R    Flexibility: tight UT, LS, and pecs    Strength/MMT: (* denotes performed with pain)  Shoulder Elbow Scapular   Flexion: R 4*/5; L 4+/5  Abduction: R 3*/5; L 5-/5  ER: R 4*/5; L 4+/5  IR: R 4-*/5; L 4+/5 Flexion: R 5/5; L 5/5   Rhomboids: RNT/5, L NT/5  Mid trap: R NT/5; L NT/5   Lats: R NT/5, L NT/5  Low trap: R NT/5; L NT/5     Special tests:     Subacromial Impingement:  Painful arc: R +; L -  Chandler Jeff: R -; L -  Neer: R -; L -  Empty can: R +; L -    RC pathology:  Drop arm: R -; L -  Able to but feels weak  External rotation lag: R +; L -  Uncomfortable  Lift off: R -  Hornblower sign: R +; L -  Uncomfortable    AC joint:  Horizontal adduction sign: R +; L -    SLAP/Biceps tendonitis  James: R +  Speeds: R +      Assessment: Pt is less sore than last session, but continues to report pain in the right shoulder though it has decreased. She noted increased tension and pain in the forearm with arm elevation with hand pronated, and she had positive radial and median ULTT. Provided pt with nerve glides to perform at home, and educated patient on benefits of neutral rotation (thumb up) when elevating arm to reduce impinging the joint.       Goals:   Goals: (to be met in 8-10  visits)   Pt will report improved ability to sleep without waking due to shoulder pain   Pt will improve pain free shoulder flexion AROM to be able to reach into overhead cabinets without pain or restriction   Pt will improve pain free shoulder abduction AROM to improve ability to don deodorant, don/doff shirts, and wash hair   Pt will increase pain free shoulder AROM ER to be able to reach and fasten seatbelt   Pt will increase pain free shoulder AROM IR to be able to reach in back pocket, tuck in shirt, and turn steering wheel without pain  Pt will improve shoulder strength throughout to 4+/5 to improve function with baking   Pt will demonstrate increased mid/low trap strength to 4+/5 to promote improved shoulder mechanics and stabilization with lifting and reaching   Pt will be independent and compliant with comprehensive HEP to maintain progress achieved in PT      Plan: Patient will be seen for 1-2 x/week or a total of 8-10 visits over a 90 day period. Treatment will include: Manual Therapy, Neuromuscular Re-education, Therapeutic Activities, Therapeutic Exercise, and Home Exercise Program instruction  Date: 1/22/2024  TX#: 2/8-10 Date:  1/24/24               TX#: 3/8-10 Date:  1/29/24               TX#: 4/8-10 Date:  2/1/24               TX#: 5/8-10 Date: 2/5/24  Tx#: 6/8-10   TE 16'  Seated SB rolls fwd and lat x3'  UT and LS stretching 3x15\" B  Supine shoulder flexion with dowel 2x10 (supinated causes less pain)  HL claps 2x10  Wall pushup 2x10  Supinated bicep curl 3# 2x10 TE 18'  Seated SB rolls fwd and lat x3'  Supine shoulder flexion with dowel 2# 2x10   HL swimmers x20  HL claps 2x10  Supinated bicep curl in HL 3# 2x10  Seated thoracic ext over SB 2x10   Standing open book stretch x10 B  Wall pushup with plus 2x10 TE 17'  Seated thoracic ext over ball 2x10   Standing open book stretch x10 B  Wall pushup with plus 2x10  SB up the wall x10  Standing IYT 1# x10  Update HEP TE 15'  SL open book stretch x10  B  Supine bicep curls 3# 2x10 R  Seated thoracic ext over ball 2x10   Wall pushup with plus 2x10  Doorway pec stretch 4x10\"  TE 15'  Pt edu: anatomy of GH joint on skeleton and mechanics of arm elevation, RC muscle anatomy and neural tension   + radial and median nerve ULTT  SL open book stretch x10 B  Radial nerve glide x5 R  Median nerve glide x5 R   NR 12'  HL serratus punch with dowel 2x10  GH row GTB 2x10  GH ext YTB 2x10  Bilat shoulder ER YTB x20 (breaks between every few reps) NR 15'  HL serratus punch with dowel 2# 2x10  GH row GTB 2x10  GH ext RTB 2x10  HL bilat shoulder horiz abd YTB 2x10  HL bilat shoulder ER YTB x20   Low trap raise 2x10  NR 15'  GH row GTB x30  GH ext GTB x20  Standing bilat shoulder horiz abd YTB 2x10  Standing bilat shoulder ER YTB 2x10   ER isos walkout x8 YTB  IR isos walkout x8 YTB  Bent over gh ext x10, 1# x10 R  Bent over gh horiz abd x10, 1# x10 R  Bent over low trap raise x10, 1 # x10 R  NR 17'  GH row GTB x30  GH ext GTB x30  Standing bilat shoulder horiz abd YTB 2x10  Standing bilat shoulder ER YTB 2x10   ER isos walkout 2x8 YTB  IR isos walkout 2x8 YTB NR 17'  GH row GTB x30  GH ext GTB x30  Serratus punch GTB x20   Standing bilat shoulder horiz abd RTB 2x15  Standing bilat shoulder ER RTB 2x15   Y raise up the wall x20    MT 8'  STM along pecs, biceps tendon, and parascap mm MT 10'  STM along pecs, biceps tendon, and parascap mm MT 10'  STM along pecs, biceps tendon, and parascap mm R MT 10'  STM along pecs, biceps tendon, and parascap mm R  Gentle AP gh glide  Gentle gh traction   Manual nerve glides radial and median   R arm            HEP:   - Seated Scapular Retraction  - 5-6 x daily - 7 x weekly - 10 reps  - Doorway Pec Stretch at 90 Degrees Abduction  - 2-3 x daily - 7 x weekly - 10 reps - 10 sec hold  - Radial Nerve Gliding: 's Tip  - 1 x daily - 7 x weekly - 10 reps  - Median Nerve Flossing - Tray  - 1 x daily - 7 x weekly - 10 reps  - Shoulder External  Rotation and Scapular Retraction with Resistance  - 1 x daily - 7 x weekly - 2 sets - 10 reps  - Standing Shoulder Horizontal Abduction with Resistance  - 1 x daily - 7 x weekly - 2 sets - 10 reps  - Low Trap Setting at Wall  - 1 x daily - 7 x weekly - 2 sets - 10 reps    Charges: 1 TE 1 NR 1 MT      Total Timed Treatment: 42 min  Total Treatment Time: 42 min

## 2024-02-05 ENCOUNTER — OFFICE VISIT (OUTPATIENT)
Dept: PHYSICAL THERAPY | Facility: HOSPITAL | Age: 39
End: 2024-02-05
Payer: MEDICAID

## 2024-02-05 PROCEDURE — 97112 NEUROMUSCULAR REEDUCATION: CPT

## 2024-02-05 PROCEDURE — 97110 THERAPEUTIC EXERCISES: CPT

## 2024-02-05 PROCEDURE — 97140 MANUAL THERAPY 1/> REGIONS: CPT

## 2024-02-12 ENCOUNTER — OFFICE VISIT (OUTPATIENT)
Dept: PHYSICAL THERAPY | Facility: HOSPITAL | Age: 39
End: 2024-02-12
Payer: MEDICAID

## 2024-02-12 PROCEDURE — 97112 NEUROMUSCULAR REEDUCATION: CPT

## 2024-02-12 PROCEDURE — 97110 THERAPEUTIC EXERCISES: CPT

## 2024-02-12 NOTE — PROGRESS NOTES
Diagnosis:   Acute pain of right shoulder (M25.511)       Referring Provider: Barak  Date of Evaluation:    1/16/2024    Precautions:  None Next MD visit:   none scheduled  Date of Surgery: n/a     Insurance Primary/Secondary: BLUE CROSS MEDICAID / N/A     # Auth Visits: 11            Subjective: Pt said shoulder is good and better than before.     Pain: 1-1.5/10      Objective:  reviewed sleeping position while on right side     ----------    Observation/Posture: rounded shoulders posture  Palpation: tenderness along biceps tendon and supraspinatus tendon  Sensation: denies  Cervical Screen: WNL    AROM: (* denotes performed with pain)  Gross shoulder ROM: WFL  - pain noted with shoulder ABD R, and functional ER and IR on R    Flexibility: tight UT, LS, and pecs    Strength/MMT: (* denotes performed with pain)  Shoulder Elbow Scapular   Flexion: R 4*/5; L 4+/5  Abduction: R 3*/5; L 5-/5  ER: R 4*/5; L 4+/5  IR: R 4-*/5; L 4+/5 Flexion: R 5/5; L 5/5   Rhomboids: RNT/5, L NT/5  Mid trap: R NT/5; L NT/5   Lats: R NT/5, L NT/5  Low trap: R NT/5; L NT/5   Flexion: R 4*/5; L 4+/5  Abduction: R 3*/5; L 5-/5  ER: R 4*/5; L 4+/5  IR: R 4-*/5; L 4+/5       Special tests:     Subacromial Impingement:  Painful arc: R +; L -  Empty can: R +; L -    RC pathology:  External rotation lag: R +; L -  Uncomfortable  Hornblower sign: R +; L -  Uncomfortable    AC joint:  Horizontal adduction sign: R +; L -    SLAP/Biceps tendonitis  James: R +  Speeds: R +      Assessment: Pt arrived with questions regarding continuation of PT for the shoulder or for additional c/o knee pain. Spent time answering and clarifying the patient's questions. She is making good progress with PT, her symptoms are decreasing. Most difficulty with longer lever movements. Added 3 more appointments to use all authorized visits and continue strengthening scapular mm to reduce symptoms.     Goals:   Goals: (to be met in 8-10 visits)   Pt will report improved  ability to sleep without waking due to shoulder pain   Pt will improve pain free shoulder flexion AROM to be able to reach into overhead cabinets without pain or restriction   Pt will improve pain free shoulder abduction AROM to improve ability to don deodorant, don/doff shirts, and wash hair   Pt will increase pain free shoulder AROM ER to be able to reach and fasten seatbelt   Pt will increase pain free shoulder AROM IR to be able to reach in back pocket, tuck in shirt, and turn steering wheel without pain  Pt will improve shoulder strength throughout to 4+/5 to improve function with baking   Pt will demonstrate increased mid/low trap strength to 4+/5 to promote improved shoulder mechanics and stabilization with lifting and reaching   Pt will be independent and compliant with comprehensive HEP to maintain progress achieved in PT      Plan: Patient will be seen for 1-2 x/week or a total of 8-10 visits over a 90 day period. Treatment will include: Manual Therapy, Neuromuscular Re-education, Therapeutic Activities, Therapeutic Exercise, and Home Exercise Program instruction  Date:  1/24/24               TX#: 3/8-10 Date:  1/29/24               TX#: 4/8-10 Date:  2/1/24               TX#: 5/8-10 Date: 2/5/24  Tx#: 6/8-10 Date: 2/12/24  Tx#: 7/8-10   TE 18'  Seated SB rolls fwd and lat x3'  Supine shoulder flexion with dowel 2# 2x10   HL swimmers x20  HL claps 2x10  Supinated bicep curl in HL 3# 2x10  Seated thoracic ext over SB 2x10   Standing open book stretch x10 B  Wall pushup with plus 2x10 TE 17'  Seated thoracic ext over ball 2x10   Standing open book stretch x10 B  Wall pushup with plus 2x10  SB up the wall x10  Standing IYT 1# x10  Update HEP TE 15'  SL open book stretch x10 B  Supine bicep curls 3# 2x10 R  Seated thoracic ext over ball 2x10   Wall pushup with plus 2x10  Doorway pec stretch 4x10\"  TE 15'  Pt edu: anatomy of GH joint on skeleton and mechanics of arm elevation, RC muscle anatomy and neural  tension   + radial and median nerve ULTT  SL open book stretch x10 B  Radial nerve glide x5 R  Median nerve glide x5 R TE 15'  Discussion on need for PT, scheduling, and advice- answering pt questions  1# cabinet reaches 2x10 R  Bicep curl 5# 2x10 R (focus on slow eccentric lowering)   NR 15'  HL serratus punch with dowel 2# 2x10  GH row GTB 2x10  GH ext RTB 2x10  HL bilat shoulder horiz abd YTB 2x10  HL bilat shoulder ER YTB x20   Low trap raise 2x10  NR 15'  GH row GTB x30  GH ext GTB x20  Standing bilat shoulder horiz abd YTB 2x10  Standing bilat shoulder ER YTB 2x10   ER isos walkout x8 YTB  IR isos walkout x8 YTB  Bent over gh ext x10, 1# x10 R  Bent over gh horiz abd x10, 1# x10 R  Bent over low trap raise x10, 1 # x10 R  NR 17'  GH row GTB x30  GH ext GTB x30  Standing bilat shoulder horiz abd YTB 2x10  Standing bilat shoulder ER YTB 2x10   ER isos walkout 2x8 YTB  IR isos walkout 2x8 YTB NR 17'  GH row GTB x30  GH ext GTB x30  Serratus punch GTB x20   Standing bilat shoulder horiz abd RTB 2x15  Standing bilat shoulder ER RTB 2x15   Y raise up the wall x20 NR 27'  GH row BlackTB x20  GH ext BlueTB x20 R  90/90 IR RTB R 2x10   90/90 ER RTB 2x10 R  Shoulder circles 1# x30 ea direction R  Bent over gh ext 2x10 2#  R  Bent over gh horiz abd 2x10 2# R  Bent over low trap raise 2x10 1# R   MT 8'  STM along pecs, biceps tendon, and parascap mm MT 10'  STM along pecs, biceps tendon, and parascap mm MT 10'  STM along pecs, biceps tendon, and parascap mm R MT 10'  STM along pecs, biceps tendon, and parascap mm R  Gentle AP gh glide  Gentle gh traction   Manual nerve glides radial and median   R arm             HEP:   - Seated Scapular Retraction  - 5-6 x daily - 7 x weekly - 10 reps  - Doorway Pec Stretch at 90 Degrees Abduction  - 2-3 x daily - 7 x weekly - 10 reps - 10 sec hold  - Radial Nerve Gliding: 's Tip  - 1 x daily - 7 x weekly - 10 reps  - Median Nerve Flossing - Tray  - 1 x daily - 7 x weekly - 10  reps  - Shoulder External Rotation and Scapular Retraction with Resistance  - 1 x daily - 7 x weekly - 2 sets - 10 reps  - Standing Shoulder Horizontal Abduction with Resistance  - 1 x daily - 7 x weekly - 2 sets - 10 reps  - Low Trap Setting at Wall  - 1 x daily - 7 x weekly - 2 sets - 10 reps    Charges: 1 TE 2 NR       Total Timed Treatment: 42 min  Total Treatment Time: 42 min

## 2024-02-19 ENCOUNTER — OFFICE VISIT (OUTPATIENT)
Dept: PHYSICAL THERAPY | Facility: HOSPITAL | Age: 39
End: 2024-02-19
Payer: MEDICAID

## 2024-02-19 PROCEDURE — 97110 THERAPEUTIC EXERCISES: CPT

## 2024-02-19 PROCEDURE — 97112 NEUROMUSCULAR REEDUCATION: CPT

## 2024-02-19 NOTE — PROGRESS NOTES
Diagnosis:   Acute pain of right shoulder (M25.511)       Referring Provider: Barak  Date of Evaluation:    1/16/2024    Precautions:  None Next MD visit:   none scheduled  Date of Surgery: n/a     Insurance Primary/Secondary: BLUE CROSS MEDICAID / N/A     # Auth Visits: 11            Subjective: Pt said shoulder is good. Is not as bothersome to lay on right side.     Pain: 1/10      Objective:  fatigued with rhythmic stab with ball on wall     ----------    Observation/Posture: rounded shoulders posture  Palpation: tenderness along biceps tendon and supraspinatus tendon  Sensation: denies  Cervical Screen: WNL    AROM: (* denotes performed with pain)  Gross shoulder ROM: WFL  - pain noted with shoulder ABD R, and functional ER and IR on R    Flexibility: tight UT, LS, and pecs    Strength/MMT: (* denotes performed with pain)  Shoulder Elbow Scapular   Flexion: R 4*/5; L 4+/5  Abduction: R 3*/5; L 5-/5  ER: R 4*/5; L 4+/5  IR: R 4-*/5; L 4+/5 Flexion: R 5/5; L 5/5   Rhomboids: RNT/5, L NT/5  Mid trap: R NT/5; L NT/5   Lats: R NT/5, L NT/5  Low trap: R NT/5; L NT/5   Flexion: R 4*/5; L 4+/5  Abduction: R 3*/5; L 5-/5  ER: R 4*/5; L 4+/5  IR: R 4-*/5; L 4+/5       Special tests:     Subacromial Impingement:  Painful arc: R +; L -  Empty can: R +; L -    RC pathology:  External rotation lag: R +; L -  Uncomfortable  Hornblower sign: R +; L -  Uncomfortable    AC joint:  Horizontal adduction sign: R +; L -    SLAP/Biceps tendonitis  Yergason: R +  Speeds: R +      Assessment: Pt is making good progress with PT, she is able to sleep on her right side more comfortable. Continued focusing on scapular strengthening and endurance, just reports fatigue and denies pain.     Goals:   Goals: (to be met in 8-10 visits)   Pt will report improved ability to sleep without waking due to shoulder pain   Pt will improve pain free shoulder flexion AROM to be able to reach into overhead cabinets without pain or restriction   Pt will  improve pain free shoulder abduction AROM to improve ability to don deodorant, don/doff shirts, and wash hair   Pt will increase pain free shoulder AROM ER to be able to reach and fasten seatbelt   Pt will increase pain free shoulder AROM IR to be able to reach in back pocket, tuck in shirt, and turn steering wheel without pain  Pt will improve shoulder strength throughout to 4+/5 to improve function with baking   Pt will demonstrate increased mid/low trap strength to 4+/5 to promote improved shoulder mechanics and stabilization with lifting and reaching   Pt will be independent and compliant with comprehensive HEP to maintain progress achieved in PT      Plan: Patient will be seen for 1-2 x/week or a total of 8-10 visits over a 90 day period. Treatment will include: Manual Therapy, Neuromuscular Re-education, Therapeutic Activities, Therapeutic Exercise, and Home Exercise Program instruction  Date:  1/29/24               TX#: 4/8-10 Date:  2/1/24               TX#: 5/8-10 Date: 2/5/24  Tx#: 6/8-10 Date: 2/12/24  Tx#: 7/8-10 Date: 2/19/24  Tx#: 8/11 auth   TE 17'  Seated thoracic ext over ball 2x10   Standing open book stretch x10 B  Wall pushup with plus 2x10  SB up the wall x10  Standing IYT 1# x10  Update HEP TE 15'  SL open book stretch x10 B  Supine bicep curls 3# 2x10 R  Seated thoracic ext over ball 2x10   Wall pushup with plus 2x10  Doorway pec stretch 4x10\"  TE 15'  Pt edu: anatomy of GH joint on skeleton and mechanics of arm elevation, RC muscle anatomy and neural tension   + radial and median nerve ULTT  SL open book stretch x10 B  Radial nerve glide x5 R  Median nerve glide x5 R TE 15'  Discussion on need for PT, scheduling, and advice- answering pt questions  1# cabinet reaches 2x10 R  Bicep curl 5# 2x10 R (focus on slow eccentric lowering) TE 12'  UBE fwd/bwd 3' ea  Bicep curl (GTB facing away) 2-3x10 R GTB (focus on eccentric lowering)   Supinated to pronated bicep curl x20 R 3#   NR 15'  GH row GTB  x30  GH ext GTB x20  Standing bilat shoulder horiz abd YTB 2x10  Standing bilat shoulder ER YTB 2x10   ER isos walkout x8 YTB  IR isos walkout x8 YTB  Bent over gh ext x10, 1# x10 R  Bent over gh horiz abd x10, 1# x10 R  Bent over low trap raise x10, 1 # x10 R  NR 17'  GH row GTB x30  GH ext GTB x30  Standing bilat shoulder horiz abd YTB 2x10  Standing bilat shoulder ER YTB 2x10   ER isos walkout 2x8 YTB  IR isos walkout 2x8 YTB NR 17'  GH row GTB x30  GH ext GTB x30  Serratus punch GTB x20   Standing bilat shoulder horiz abd RTB 2x15  Standing bilat shoulder ER RTB 2x15   Y raise up the wall x20 NR 27'  GH row BlackTB x20  GH ext BlueTB x20 R  90/90 IR RTB R 2x10   90/90 ER RTB 2x10 R  Shoulder circles 1# x30 ea direction R  Bent over gh ext 2x10 2#  R  Bent over gh horiz abd 2x10 2# R  Bent over low trap raise 2x10 1# R NR 30'  Rhythmic stab with 2# mb on wall cw/ccw 3x30\" ea R  Wall clocks YTB x8 bilat  PNF D1 ext GTB 2x10 R  PNF D2 flex RTB 2x10 R  Cone taps (3 cones with break in between) 1# x5 R, 2 taps then break x5; 3 taps then break x5, 5 taps x5  Bent over gh ext 2x10 3#  R  Bent over gh horiz abd 2x10 3# R  Bent over low trap raise 2x10 3# R   MT 10'  STM along pecs, biceps tendon, and parascap mm MT 10'  STM along pecs, biceps tendon, and parascap mm R MT 10'  STM along pecs, biceps tendon, and parascap mm R  Gentle AP gh glide  Gentle gh traction   Manual nerve glides radial and median   R arm              HEP:   - Seated Scapular Retraction  - 5-6 x daily - 7 x weekly - 10 reps  - Doorway Pec Stretch at 90 Degrees Abduction  - 2-3 x daily - 7 x weekly - 10 reps - 10 sec hold  - Radial Nerve Gliding: 's Tip  - 1 x daily - 7 x weekly - 10 reps  - Median Nerve Flossing - Tray  - 1 x daily - 7 x weekly - 10 reps  - Shoulder External Rotation and Scapular Retraction with Resistance  - 1 x daily - 7 x weekly - 2 sets - 10 reps  - Standing Shoulder Horizontal Abduction with Resistance  - 1 x daily - 7 x  weekly - 2 sets - 10 reps  - Low Trap Setting at Wall  - 1 x daily - 7 x weekly - 2 sets - 10 reps    Charges: 1 TE 2 NR       Total Timed Treatment: 42 min  Total Treatment Time: 42 min

## 2024-02-20 ENCOUNTER — OFFICE VISIT (OUTPATIENT)
Dept: INTERNAL MEDICINE CLINIC | Facility: CLINIC | Age: 39
End: 2024-02-20
Payer: MEDICAID

## 2024-02-20 VITALS
DIASTOLIC BLOOD PRESSURE: 70 MMHG | WEIGHT: 208.81 LBS | HEIGHT: 64 IN | HEART RATE: 102 BPM | SYSTOLIC BLOOD PRESSURE: 114 MMHG | BODY MASS INDEX: 35.65 KG/M2 | OXYGEN SATURATION: 100 %

## 2024-02-20 DIAGNOSIS — R03.0 ELEVATED BLOOD PRESSURE READING: ICD-10-CM

## 2024-02-20 DIAGNOSIS — Z23 NEED FOR VACCINATION: ICD-10-CM

## 2024-02-20 DIAGNOSIS — E66.1 CLASS 2 DRUG-INDUCED OBESITY WITH BODY MASS INDEX (BMI) OF 35.0 TO 35.9 IN ADULT, UNSPECIFIED WHETHER SERIOUS COMORBIDITY PRESENT: ICD-10-CM

## 2024-02-20 DIAGNOSIS — Z78.9 TRYING TO GET PREGNANT: ICD-10-CM

## 2024-02-20 DIAGNOSIS — M79.605 PAIN IN BOTH LOWER EXTREMITIES: ICD-10-CM

## 2024-02-20 DIAGNOSIS — M25.562 PAIN IN BOTH KNEES, UNSPECIFIED CHRONICITY: ICD-10-CM

## 2024-02-20 DIAGNOSIS — M79.604 PAIN IN BOTH LOWER EXTREMITIES: ICD-10-CM

## 2024-02-20 DIAGNOSIS — M25.519 SHOULDER PAIN, UNSPECIFIED CHRONICITY, UNSPECIFIED LATERALITY: Primary | ICD-10-CM

## 2024-02-20 DIAGNOSIS — I83.90 VARICOSE VEINS: ICD-10-CM

## 2024-02-20 DIAGNOSIS — M25.561 PAIN IN BOTH KNEES, UNSPECIFIED CHRONICITY: ICD-10-CM

## 2024-02-20 PROCEDURE — 90471 IMMUNIZATION ADMIN: CPT | Performed by: INTERNAL MEDICINE

## 2024-02-20 PROCEDURE — 99215 OFFICE O/P EST HI 40 MIN: CPT | Performed by: INTERNAL MEDICINE

## 2024-02-20 PROCEDURE — 90715 TDAP VACCINE 7 YRS/> IM: CPT | Performed by: INTERNAL MEDICINE

## 2024-02-20 RX ORDER — PEAK FLOW METER
1 EACH MISCELLANEOUS AS DIRECTED
COMMUNITY
Start: 2023-10-16

## 2024-02-20 RX ORDER — LEVOFLOXACIN 500 MG/1
500 TABLET, FILM COATED ORAL DAILY
COMMUNITY
Start: 2023-10-14

## 2024-02-20 RX ORDER — NAPROXEN 500 MG/1
500 TABLET ORAL 2 TIMES DAILY WITH MEALS
Qty: 14 TABLET | Refills: 0 | Status: SHIPPED | OUTPATIENT
Start: 2024-02-20 | End: 2024-02-27

## 2024-02-20 RX ORDER — AMOXICILLIN AND CLAVULANATE POTASSIUM 875; 125 MG/1; MG/1
1 TABLET, FILM COATED ORAL 2 TIMES DAILY
COMMUNITY
Start: 2023-10-14

## 2024-02-20 NOTE — PROGRESS NOTES
Leilani Jackson is a 39 year old female.    Chief complaint: follow up     HPI:     Leilani Jackson is a 39 year old pleasant female who presents for a   Shoulder pain   Did have PT   She was taking mobic without improvement   She is much better with physical therapy but still having pain           Knee pain   Bilateral         Elevated blood pressure   Has been monitoring  BP at home       Varicose veins   Hurting   Leg pain   Bilateral  No back pain   Gets better with compression stocking   Shooting oain from the thigh to the calves         Current Outpatient Medications   Medication Sig Dispense Refill    Budesonide-Formoterol Fumarate 160-4.5 MCG/ACT Inhalation Aerosol Inhale 2 puffs into the lungs 2 (two) times daily. 30.6 g 0    fluticasone propionate 50 MCG/ACT Nasal Suspension 2 sprays by Nasal route daily. 16 g 0    albuterol 108 (90 Base) MCG/ACT Inhalation Aero Soln Inhale 2 puffs into the lungs every 6 (six) hours as needed for Wheezing or Shortness of Breath. 2 each 2    oseltamivir 75 MG Oral Cap       Meloxicam 15 MG Oral Tab Take 1 tablet (15 mg total) by mouth daily. 30 tablet 0    ERGOCALCIFEROL 1.25 MG (55934 UT) Oral Cap TAKE 1 CAPSULE BY MOUTH 1 TIME A WEEK FOR 12 DOSES 12 capsule 0    CETIRIZINE HCL OR Take by mouth as needed.      Oxymetazoline HCl (NASAL SPRAY NA) by Nasal route as needed.      Multiple Vitamin (MULTI-VITAMIN DAILY) Oral Tab Take by mouth.      amoxicillin clavulanate 875-125 MG Oral Tab Take 1 tablet by mouth 2 (two) times daily. (Patient not taking: Reported on 2/20/2024)      levoFLOXacin 500 MG Oral Tab Take 1 tablet (500 mg total) by mouth daily. (Patient not taking: Reported on 2/20/2024)      metFORMIN 500 MG Oral Tab Take 1 tablet (500 mg total) by mouth daily with dinner. (Patient not taking: Reported on 2/20/2024)      Nebulizers (VIOS AEROSOL DELIVERY SYSTEM) Does not apply Misc Take 1 Bottle by mouth As Directed. (Patient not taking: Reported on 2/20/2024)      nystatin  563130 UNIT/ML Mouth/Throat Suspension Take 5 mL (500,000 Units total) by mouth 4 (four) times daily. (Patient not taking: Reported on 2/20/2024) 60 mL 0    dicyclomine 10 MG Oral Cap Take 1 capsule (10 mg total) by mouth 3 (three) times daily as needed. (Patient not taking: Reported on 2/20/2024) 30 capsule 2    Clindamycin Phosphate 1 % External Foam Apply 1 Application topically 2 (two) times daily. (Patient not taking: Reported on 2/20/2024) 100 g 0    amoxicillin 500 MG Oral Cap Take 1 capsule (500 mg total) by mouth 2 (two) times daily. (Patient not taking: Reported on 2/20/2024)      HYDROcodone-acetaminophen 5-325 MG Oral Tab Take 1-2 tablets by mouth every 4 (four) hours as needed for Pain. (Patient not taking: Reported on 2/20/2024) 40 tablet 0    docusate sodium 100 MG Oral Cap Take 1 capsule (100 mg total) by mouth 2 (two) times daily. (Patient not taking: Reported on 2/20/2024) 40 capsule 0    ondansetron (ZOFRAN) 4 mg tablet Take 1 tablet (4 mg total) by mouth every 8 (eight) hours as needed for Nausea. (Patient not taking: Reported on 2/20/2024) 30 tablet 0    methylPREDNISolone (MEDROL) 4 MG Oral Tablet Therapy Pack As directed. (Patient not taking: Reported on 2/20/2024) 1 each 0    guaiFENesin-codeine (CHERATUSSIN AC) 100-10 MG/5ML Oral Solution Take 5 mL by mouth 4 (four) times daily as needed for cough or congestion. (Patient not taking: Reported on 2/20/2024) 118 mL 0      Past Medical History:   Diagnosis Date    Asthma (HCC)     COVID 10/23/2020    Runny nose, asthma symptoms. No hospitalization    Hx of motion sickness      No past surgical history on file.          Family History   Problem Relation Age of Onset    Cancer Mother     Other (lymph nodes) Mother         Cancer    Cancer Maternal Grandfather     Diabetes Paternal Grandfather      Patient Active Problem List   Diagnosis    Macromastia    High serum parathyroid hormone (PTH)    Cyst of ovary    Endometrial thickening on ultrasound     Hypercalcemia    Mild asthma without complication    Allergic rhinitis    Urinary symptom or sign    Abdominal cramps    Elevated parathyroid hormone    Vitamin D deficiency    S/P bilateral breast reduction    Iron deficiency    Hyperparathyroidism (HCC)    Abdominal pain    Shoulder pain    Oral thrush    Influenza    Obesity       REVIEW OF SYSTEMS:   A comprehensive 10 point review of systems was completed.  Pertinent positives and negatives noted in the the HPI            EXAM:   /70   Pulse 102   Ht 5' 4\" (1.626 m)   Wt 208 lb 12.8 oz (94.7 kg)   LMP 08/01/2023 (Approximate)   SpO2 100%   BMI 35.84 kg/m²     LUNGS: clear to auscultation  CARDIO: RRR without murmur  GI: no masses, HSM or tenderness  EXTREMITIES: right shoulder with pain with ROM   + scratch test   Legs : minimal spider veins   NEURO: no gross deficits              Orders Placed This Encounter    amoxicillin clavulanate 875-125 MG Oral Tab     Sig: Take 1 tablet by mouth 2 (two) times daily.    levoFLOXacin 500 MG Oral Tab     Sig: Take 1 tablet (500 mg total) by mouth daily.    metFORMIN 500 MG Oral Tab     Sig: Take 1 tablet (500 mg total) by mouth daily with dinner.    Nebulizers (VIOS AEROSOL DELIVERY SYSTEM) Does not apply Misc     Sig: Take 1 Bottle by mouth As Directed.     No results found.         ASSESSMENT AND PLAN:     1. Shoulder pain, unspecified chronicity, unspecified laterality  Doesn't want to take steroid   Can start naprosyn   XR shoulder   Referral to ortho   Continue PT   - XR SHOULDER, COMPLETE (MIN 2 VIEWS), RIGHT (CPT=73030); Future  - Ortho Referral - In Network  - XR LUMBAR SPINE (MIN 2 VIEWS) (CPT=72100); Future  - US VENOUS INSUFFICIENCY (REFLUX) BILAT LOWER EXT SH(CPT=93970); Future    2. Pain in both lower extremities  US venous   XR lumbar spine   Nsaids   Consider PT based on the results     3. Varicose veins  US venous doppler     4. Trying to get pregnant  Follow up with OB gyne   Reviewed and  discussed blood work ordered by OB gyne   - TETANUS, DIPHTHERIA TOXOIDS AND ACELLULAR PERTUSIS VACCINE (TDAP), >7 YEARS, IM USE    5. 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 Need for vaccination  TDAP   - TETANUS, DIPHTHERIA TOXOIDS AND ACELLULAR PERTUSIS VACCINE (TDAP), >7 YEARS, IM USE    7. Pain in both knees, unspecified chronicity  Naprosyn   Ortho referral   Reviewed previous X rays       8. Elevated blood pressure reading  Monitor BP at home   No indication to start BP medicine today   Compared her home machine to manual BP recheck   Weight loss   Diet and exercise     I spent 40 minutes at the day of the service seeing the patient, examination, reviewing labs, independently interpreting results, completing charting and counseling the patient and/or on coordination of care.  The diagnosis, prognosis, and general treatment was explained to the patient.        Please return to the clinic if you are having persistent symptoms. If worsening symptoms should go to the ER    Kishan Plunkett MD,   Diplomate of the American Board of Internal Medicine  Diplomate of the American Board of Obesity Medicine

## 2024-03-02 ENCOUNTER — HOSPITAL ENCOUNTER (OUTPATIENT)
Dept: GENERAL RADIOLOGY | Facility: HOSPITAL | Age: 39
Discharge: HOME | End: 2024-03-02
Attending: INTERNAL MEDICINE
Payer: MEDICAID

## 2024-03-02 ENCOUNTER — LAB ENCOUNTER (OUTPATIENT)
Dept: LAB | Facility: HOSPITAL | Age: 39
End: 2024-03-02
Attending: INTERNAL MEDICINE
Payer: MEDICAID

## 2024-03-02 DIAGNOSIS — R10.12 LUQ PAIN: ICD-10-CM

## 2024-03-02 DIAGNOSIS — M25.519 SHOULDER PAIN, UNSPECIFIED CHRONICITY, UNSPECIFIED LATERALITY: ICD-10-CM

## 2024-03-02 DIAGNOSIS — E21.3 HYPERPARATHYROIDISM (HCC): ICD-10-CM

## 2024-03-02 DIAGNOSIS — J45.909 MILD ASTHMA WITHOUT COMPLICATION, UNSPECIFIED WHETHER PERSISTENT (HCC): ICD-10-CM

## 2024-03-02 DIAGNOSIS — R82.90 ABNORMAL URINALYSIS: ICD-10-CM

## 2024-03-02 DIAGNOSIS — E55.9 VITAMIN D DEFICIENCY: ICD-10-CM

## 2024-03-02 DIAGNOSIS — E61.1 IRON DEFICIENCY: ICD-10-CM

## 2024-03-02 DIAGNOSIS — R14.0 ABDOMINAL BLOATING: ICD-10-CM

## 2024-03-02 DIAGNOSIS — E83.52 HYPERCALCEMIA: ICD-10-CM

## 2024-03-02 LAB
DEPRECATED HBV CORE AB SER IA-ACNC: 19.7 NG/ML
IRON SATN MFR SERPL: 19 %
IRON SERPL-MCNC: 64 UG/DL
TIBC SERPL-MCNC: 334 UG/DL (ref 250–425)
TRANSFERRIN SERPL-MCNC: 224 MG/DL (ref 250–380)

## 2024-03-02 PROCEDURE — 82728 ASSAY OF FERRITIN: CPT

## 2024-03-02 PROCEDURE — 84466 ASSAY OF TRANSFERRIN: CPT

## 2024-03-02 PROCEDURE — 83540 ASSAY OF IRON: CPT

## 2024-03-02 PROCEDURE — 36415 COLL VENOUS BLD VENIPUNCTURE: CPT

## 2024-03-04 ENCOUNTER — OFFICE VISIT (OUTPATIENT)
Dept: INTERNAL MEDICINE CLINIC | Facility: CLINIC | Age: 39
End: 2024-03-04
Payer: MEDICAID

## 2024-03-04 VITALS
WEIGHT: 205.63 LBS | DIASTOLIC BLOOD PRESSURE: 82 MMHG | BODY MASS INDEX: 35.1 KG/M2 | OXYGEN SATURATION: 98 % | HEART RATE: 92 BPM | HEIGHT: 64 IN | SYSTOLIC BLOOD PRESSURE: 120 MMHG

## 2024-03-04 DIAGNOSIS — J45.901 MODERATE ASTHMA WITH EXACERBATION, UNSPECIFIED WHETHER PERSISTENT (HCC): Primary | ICD-10-CM

## 2024-03-04 DIAGNOSIS — R05.9 COUGH, UNSPECIFIED TYPE: ICD-10-CM

## 2024-03-04 PROCEDURE — 99214 OFFICE O/P EST MOD 30 MIN: CPT | Performed by: INTERNAL MEDICINE

## 2024-03-04 RX ORDER — AZITHROMYCIN 250 MG/1
TABLET, FILM COATED ORAL
Qty: 6 TABLET | Refills: 0 | Status: SHIPPED | OUTPATIENT
Start: 2024-03-04 | End: 2024-03-08

## 2024-03-04 RX ORDER — CODEINE PHOSPHATE AND GUAIFENESIN 10; 100 MG/5ML; MG/5ML
5 SOLUTION ORAL EVERY 6 HOURS PRN
Qty: 118 ML | Refills: 0 | Status: SHIPPED | OUTPATIENT
Start: 2024-03-04

## 2024-03-04 NOTE — PROGRESS NOTES
Leilani Jackson is a 39 year old female.    Chief complaint: cough and sob     HPI:     Leilani Jackson is a 39 year old pleasant female who presents for   8 days ago   Started having sob   Cough started 2 days later   Productive of green phlegm   No fever or chills   +sob   No chest pain   She feels 70% back to normal   Voice is getting better   Albuterol as needed   Not using symbicort           Current Outpatient Medications   Medication Sig Dispense Refill    amoxicillin clavulanate 875-125 MG Oral Tab Take 1 tablet by mouth 2 (two) times daily. (Patient not taking: Reported on 2/20/2024)      levoFLOXacin 500 MG Oral Tab Take 1 tablet (500 mg total) by mouth daily. (Patient not taking: Reported on 2/20/2024)      metFORMIN 500 MG Oral Tab Take 1 tablet (500 mg total) by mouth daily with dinner. (Patient not taking: Reported on 2/20/2024)      Nebulizers (Traackr AEROSOL DELIVERY SYSTEM) Does not apply Misc Take 1 Bottle by mouth As Directed. (Patient not taking: Reported on 2/20/2024)      Budesonide-Formoterol Fumarate 160-4.5 MCG/ACT Inhalation Aerosol Inhale 2 puffs into the lungs 2 (two) times daily. 30.6 g 0    fluticasone propionate 50 MCG/ACT Nasal Suspension 2 sprays by Nasal route daily. 16 g 0    albuterol 108 (90 Base) MCG/ACT Inhalation Aero Soln Inhale 2 puffs into the lungs every 6 (six) hours as needed for Wheezing or Shortness of Breath. 2 each 2    nystatin 593912 UNIT/ML Mouth/Throat Suspension Take 5 mL (500,000 Units total) by mouth 4 (four) times daily. (Patient not taking: Reported on 2/20/2024) 60 mL 0    oseltamivir 75 MG Oral Cap       dicyclomine 10 MG Oral Cap Take 1 capsule (10 mg total) by mouth 3 (three) times daily as needed. (Patient not taking: Reported on 2/20/2024) 30 capsule 2    Clindamycin Phosphate 1 % External Foam Apply 1 Application topically 2 (two) times daily. (Patient not taking: Reported on 2/20/2024) 100 g 0    amoxicillin 500 MG Oral Cap Take 1 capsule (500 mg total) by  mouth 2 (two) times daily. (Patient not taking: Reported on 2/20/2024)      Meloxicam 15 MG Oral Tab Take 1 tablet (15 mg total) by mouth daily. 30 tablet 0    ERGOCALCIFEROL 1.25 MG (79690 UT) Oral Cap TAKE 1 CAPSULE BY MOUTH 1 TIME A WEEK FOR 12 DOSES 12 capsule 0    HYDROcodone-acetaminophen 5-325 MG Oral Tab Take 1-2 tablets by mouth every 4 (four) hours as needed for Pain. (Patient not taking: Reported on 2/20/2024) 40 tablet 0    docusate sodium 100 MG Oral Cap Take 1 capsule (100 mg total) by mouth 2 (two) times daily. (Patient not taking: Reported on 2/20/2024) 40 capsule 0    ondansetron (ZOFRAN) 4 mg tablet Take 1 tablet (4 mg total) by mouth every 8 (eight) hours as needed for Nausea. (Patient not taking: Reported on 2/20/2024) 30 tablet 0    CETIRIZINE HCL OR Take by mouth as needed.      methylPREDNISolone (MEDROL) 4 MG Oral Tablet Therapy Pack As directed. (Patient not taking: Reported on 2/20/2024) 1 each 0    guaiFENesin-codeine (CHERATUSSIN AC) 100-10 MG/5ML Oral Solution Take 5 mL by mouth 4 (four) times daily as needed for cough or congestion. (Patient not taking: Reported on 2/20/2024) 118 mL 0    Oxymetazoline HCl (NASAL SPRAY NA) by Nasal route as needed.      Multiple Vitamin (MULTI-VITAMIN DAILY) Oral Tab Take by mouth.        Past Medical History:   Diagnosis Date    Asthma ([facility])     COVID 10/23/2020    Runny nose, asthma symptoms. No hospitalization    Hx of motion sickness      No past surgical history on file.          Family History   Problem Relation Age of Onset    Cancer Mother     Other (lymph nodes) Mother         Cancer    Cancer Maternal Grandfather     Diabetes Paternal Grandfather      Patient Active Problem List   Diagnosis    Macromastia    High serum parathyroid hormone (PTH)    Cyst of ovary    Endometrial thickening on ultrasound    Hypercalcemia    Mild asthma without complication ([facility])    Allergic rhinitis    Urinary symptom or sign    Abdominal cramps    Elevated  parathyroid hormone    Vitamin D deficiency    S/P bilateral breast reduction    Iron deficiency    Hyperparathyroidism ([facility]CC)    Abdominal pain    Shoulder pain    Oral thrush    Influenza    Obesity    Trying to get pregnant    Varicose veins    Pain in both lower extremities    Need for vaccination       REVIEW OF SYSTEMS:   A comprehensive 10 point review of systems was completed.  Pertinent positives and negatives noted in the the HPI            EXAM:   /82   Pulse 92   Ht 5' 4\" (1.626 m)   Wt 205 lb 9.6 oz (93.3 kg)   LMP 08/01/2023 (Approximate)   SpO2 98%   BMI 35.29 kg/m²   GENERAL: well developed, well nourished,in no apparent distress    LUNGS: coarse breathing sound no wheezing or crackles     CARDIO: RRR without murmur    NEURO: no gross deficits              No orders of the defined types were placed in this encounter.    No results found.         ASSESSMENT AND PLAN:       ICD-10-CM    1. Moderate asthma with exacerbation, unspecified whether persistent ([facility])  J45.901 guaiFENesin-codeine (CHERATUSSIN AC) 100-10 MG/5ML Oral Solution     azithromycin (ZITHROMAX Z-ADAIR) 250 MG Oral Tab     XR CHEST PA + LAT CHEST (CPT=71046)      2. Cough, unspecified type  R05.9 guaiFENesin-codeine (CHERATUSSIN AC) 100-10 MG/5ML Oral Solution     azithromycin (ZITHROMAX Z-ADAIR) 250 MG Oral Tab     XR CHEST PA + LAT CHEST (CPT=71046)         Use albuterol as needed  To restart Symbicort daily for 7 days  Cheratussin : as needed for cough advised not to take and drive  Z-[place] if persistent symptoms  If she continues to have symptoms or worsening to have a chest x-ray  Discussed that she is out of the window for treatment of flu/COVID/RSV no need for testing  Prefers not to take any steroid : will hold off for now     Please return to the clinic if you are having persistent symptoms. If worsening symptoms should go to the ER    Kishan Plunkett MD,   Diplomate of the American Board of Internal Medicine  Diplomate of  the American Board of Obesity Medicine

## 2024-03-06 NOTE — PROGRESS NOTES
Diagnosis:   Acute pain of right shoulder (M25.511)       Referring Provider: Barak  Date of Evaluation:    1/16/2024    Precautions:  None Next MD visit:   none scheduled  Date of Surgery: n/a     Insurance Primary/Secondary: [employer]ICAID / N/A     # Auth Visits: 11            Subjective: Pt was feeling good has been around 1/2 to 1/10, but last 2 days has been 1-2/10. Can sleep on her right side    Pain: 1-2/10      Objective:      Palpation: 3/8/24 no tenderness along biceps tendon and supraspinatus tendon    AROM: (* denotes performed with pain)  Gross shoulder ROM: WFL  3/8/24: slight pain with functional IR and abduction (end range)     Strength/MMT: (* denotes performed with pain)  Shoulder Elbow Scapular   Flexion: R 4*/5; L 4+/5  Abduction: R 3*/5; L 5-/5  ER: R 4*/5; L 4+/5  IR: R 4-*/5; L 4+/5 Flexion: R 5/5; L 5/5   Rhomboids: RNT/5, L NT/5  Mid trap: R NT/5; L NT/5   Lats: R NT/5, L NT/5  Low trap: R NT/5; L NT/5   Flexion: R 4*/5; L 4+/5  Abduction: R 3*/5; L 5-/5  ER: R 4*/5; L 4+/5  IR: R 4-*/5; L 4+/5     3/8/24  Flexion: R 5-/5; L 5-/5  Abduction: R 5-/5; L 5/5  ER: R /5-5; L 5-/5  IR: R 5/5; L 5/5  Rhomboids: R 5/5, L 5/5  Mid trap: R 5-/5; L 5/5   Lats: R 5-/5, L 5/5  Low trap: R 4+/5; L 5/5  In sitting      Special tests:   Subacromial Impingement:  Painful arc: R +; L -  Empty can: R +; L -  3/8/24: ongoing pain with end range, negative empty can     RC pathology: negative    AC joint: negative    SLAP/Biceps tendonitis: negative    Assessment: Pt returning after 1 week off from PT- symptoms remain low between 1-2/10. Ongoing pain with end range shoulder abduction, though mobility and strength have improved and special tests are negative. Will continue with remaining 2 visits to progress scapular strength and endurance then plan to DC.     Goals:   Goals: (to be met in 8-10 visits)   Pt will report improved ability to sleep without waking due to shoulder pain met  Pt will improve pain  free shoulder flexion AROM to be able to reach into overhead cabinets without pain or restriction met  Pt will improve pain free shoulder abduction AROM to improve ability to don deodorant, don/doff shirts, and wash hair   Pt will increase pain free shoulder AROM ER to be able to reach and fasten seatbelt met  Pt will increase pain free shoulder AROM IR to be able to reach in back pocket, tuck in shirt, and turn steering wheel without pain  Pt will improve shoulder strength throughout to 4+/5 to improve function with baking met  Pt will demonstrate increased mid/low trap strength to 4+/5 to promote improved shoulder mechanics and stabilization with lifting and reaching met  Pt will be independent and compliant with comprehensive HEP to maintain progress achieved in PT ongoing     Plan: Patient will be seen for 1-2 x/week or a total of 8-10 visits over a 90 day period. Treatment will include: Manual Therapy, Neuromuscular Re-education, Therapeutic Activities, Therapeutic Exercise, and Home Exercise Program instruction  Date:  2/1/24               TX#: 5/8-10 Date: 2/5/24  Tx#: 6/8-10 Date: 2/12/24  Tx#: 7/8-10 Date: 2/19/24  Tx#: 8/11 auth Date: 3/8/24  Tx#: 9/11 (auth)   TE 15'  SL open book stretch x10 B  Supine bicep curls 3# 2x10 R  Seated thoracic ext over ball 2x10   Wall pushup with plus 2x10  Doorway pec stretch 4x10\"  TE 15'  Pt edu: anatomy of GH joint on skeleton and mechanics of arm elevation, RC muscle anatomy and neural tension   + radial and median nerve ULTT  SL open book stretch x10 B  Radial nerve glide x5 R  Median nerve glide x5 R TE 15'  Discussion on need for PT, scheduling, and advice- answering pt questions  1# cabinet reaches 2x10 R  Bicep curl 5# 2x10 R (focus on slow eccentric lowering) TE 12'  UBE fwd/bwd 3' ea  Bicep curl (GTB facing away) 2-3x10 R GTB (focus on eccentric lowering)   Supinated to pronated bicep curl x20 R 3# TE 23'  Gross reassessment and discussion regarding POC  UBE  fwd/bwd 3' ea  Standing IYT 1# x8 bilat   NR 17'  GH row GTB x30  GH ext GTB x30  Standing bilat shoulder horiz abd YTB 2x10  Standing bilat shoulder ER YTB 2x10   ER isos walkout 2x8 YTB  IR isos walkout 2x8 YTB NR 17'  GH row GTB x30  GH ext GTB x30  Serratus punch GTB x20   Standing bilat shoulder horiz abd RTB 2x15  Standing bilat shoulder ER RTB 2x15   Y raise up the wall x20 NR 27'  GH row BlackTB x20  GH ext BlueTB x20 R  90/90 IR RTB R 2x10   90/90 ER RTB 2x10 R  Shoulder circles 1# x30 ea direction R  Bent over gh ext 2x10 2#  R  Bent over gh horiz abd 2x10 2# R  Bent over low trap raise 2x10 1# R NR 30'  Rhythmic stab with 2# mb on wall cw/ccw 3x30\" ea R  Wall clocks YTB x8 bilat  PNF D1 ext GTB 2x10 R  PNF D2 flex RTB 2x10 R  Cone taps (3 cones with break in between) 1# x5 R, 2 taps then break x5; 3 taps then break x5, 5 taps x5  Bent over gh ext 2x10 3#  R  Bent over gh horiz abd 2x10 3# R  Bent over low trap raise 2x10 3# R NR 17'  PNF D1 ext RTB x20 R  PNF D2 flex RTB x20 R  Bent over IYT 1# x8, 2# x8 bilat  Low trap setting RTB around wrists 2x10    MT 10'  STM along pecs, biceps tendon, and parascap mm R MT 10'  STM along pecs, biceps tendon, and parascap mm R  Gentle AP gh glide  Gentle gh traction   Manual nerve glides radial and median   R arm               HEP:   - Seated Scapular Retraction  - 5-6 x daily - 7 x weekly - 10 reps  - Doorway Pec Stretch at 90 Degrees Abduction  - 2-3 x daily - 7 x weekly - 10 reps - 10 sec hold  - Radial Nerve Gliding: 's Tip  - 1 x daily - 7 x weekly - 10 reps  - Median Nerve Flossing - Tray  - 1 x daily - 7 x weekly - 10 reps  - Shoulder External Rotation and Scapular Retraction with Resistance  - 1 x daily - 7 x weekly - 2 sets - 10 reps  - Standing Shoulder Horizontal Abduction with Resistance  - 1 x daily - 7 x weekly - 2 sets - 10 reps  - Low Trap Setting at Wall  - 1 x daily - 7 x weekly - 2 sets - 10 reps    Charges: 2 TE 1 NR       Total Timed  Treatment: 40 min  Total Treatment Time: 40 min

## 2024-03-08 ENCOUNTER — OFFICE VISIT (OUTPATIENT)
Dept: PHYSICAL THERAPY | Facility: HOSPITAL | Age: 39
End: 2024-03-08
Payer: MEDICAID

## 2024-03-08 PROCEDURE — 97112 NEUROMUSCULAR REEDUCATION: CPT

## 2024-03-08 PROCEDURE — 97110 THERAPEUTIC EXERCISES: CPT

## 2024-03-09 ENCOUNTER — HOSPITAL ENCOUNTER (OUTPATIENT)
Dept: GENERAL RADIOLOGY | Facility: HOSPITAL | Age: 39
Discharge: HOME | End: 2024-03-09
Attending: INTERNAL MEDICINE
Payer: MEDICAID

## 2024-03-09 PROCEDURE — 73030 X-RAY EXAM OF SHOULDER: CPT | Performed by: INTERNAL MEDICINE

## 2024-03-09 PROCEDURE — 72100 X-RAY EXAM L-S SPINE 2/3 VWS: CPT | Performed by: INTERNAL MEDICINE

## 2024-03-12 RX ORDER — FERROUS SULFATE 324(65)MG
1 TABLET, DELAYED RELEASE (ENTERIC COATED) ORAL 2 TIMES DAILY
Qty: 180 TABLET | Refills: 1 | Status: SHIPPED | OUTPATIENT
Start: 2024-03-12 | End: 2024-06-10

## 2024-03-15 ENCOUNTER — APPOINTMENT (OUTPATIENT)
Dept: PHYSICAL THERAPY | Facility: HOSPITAL | Age: 39
End: 2024-03-15
Payer: MEDICAID

## 2024-03-20 NOTE — PROGRESS NOTES
Diagnosis:   Acute pain of right shoulder (M25.511)       Referring Provider: Barak  Date of Evaluation:    1/16/2024    Precautions:  None Next MD visit:   none scheduled  Date of Surgery: n/a     Insurance Primary/Secondary: [employer]ICAID / N/A     # Auth Visits: 11            Subjective: Pt felt increased pain in the R shoulder after doing the UT stretch.     Pain: 3/10 R shoulder      Objective:  most difficulty with PNF D2 flex, and body blade flex and ext    -----    Palpation: 3/8/24 no tenderness along biceps tendon and supraspinatus tendon    AROM: (* denotes performed with pain)  Gross shoulder ROM: WFL  3/8/24: slight pain with functional IR and abduction (end range)     Strength/MMT: (* denotes performed with pain)  Shoulder Elbow Scapular   Flexion: R 4*/5; L 4+/5  Abduction: R 3*/5; L 5-/5  ER: R 4*/5; L 4+/5  IR: R 4-*/5; L 4+/5 Flexion: R 5/5; L 5/5   Rhomboids: RNT/5, L NT/5  Mid trap: R NT/5; L NT/5   Lats: R NT/5, L NT/5  Low trap: R NT/5; L NT/5   3/8/24  Flexion: R 5-/5; L 5-/5  Abduction: R 5-/5; L 5/5  ER: R /5-5; L 5-/5  IR: R 5/5; L 5/5  Rhomboids: R 5/5, L 5/5  Mid trap: R 5-/5; L 5/5   Lats: R 5-/5, L 5/5  Low trap: R 4+/5; L 5/5  In sitting        Assessment: Pt has made good improvements and overall pain has reduced. She wants to work on general strengthening to return to the gym, therefore will review this and DC next session.     Goals:   Goals: (to be met in 8-10 visits)   Pt will report improved ability to sleep without waking due to shoulder pain met  Pt will improve pain free shoulder flexion AROM to be able to reach into overhead cabinets without pain or restriction met  Pt will improve pain free shoulder abduction AROM to improve ability to don deodorant, don/doff shirts, and wash hair   Pt will increase pain free shoulder AROM ER to be able to reach and fasten seatbelt met  Pt will increase pain free shoulder AROM IR to be able to reach in back pocket, tuck in shirt, and  turn steering wheel without pain  Pt will improve shoulder strength throughout to 4+/5 to improve function with baking met  Pt will demonstrate increased mid/low trap strength to 4+/5 to promote improved shoulder mechanics and stabilization with lifting and reaching met  Pt will be independent and compliant with comprehensive HEP to maintain progress achieved in PT ongoing     Plan: Patient will be seen for 1-2 x/week or a total of 8-10 visits over a 90 day period. Treatment will include: Manual Therapy, Neuromuscular Re-education, Therapeutic Activities, Therapeutic Exercise, and Home Exercise Program instruction  DC next session and review back to workout   Date: 2/5/24  Tx#: 6/8-10 Date: 2/12/24  Tx#: 7/8-10 Date: 2/19/24  Tx#: 8/11 auth Date: 3/8/24  Tx#: 9/11 (auth) Date: 3/21/24  Tx#: 10/11 (Auth)   TE 15'  Pt edu: anatomy of GH joint on skeleton and mechanics of arm elevation, RC muscle anatomy and neural tension   + radial and median nerve ULTT  SL open book stretch x10 B  Radial nerve glide x5 R  Median nerve glide x5 R TE 15'  Discussion on need for PT, scheduling, and advice- answering pt questions  1# cabinet reaches 2x10 R  Bicep curl 5# 2x10 R (focus on slow eccentric lowering) TE 12'  UBE fwd/bwd 3' ea  Bicep curl (GTB facing away) 2-3x10 R GTB (focus on eccentric lowering)   Supinated to pronated bicep curl x20 R 3# TE 23'  Gross reassessment and discussion regarding POC  UBE fwd/bwd 3' ea  Standing IYT 1# x8 bilat TE 15'  UBE fwd/bwd 3' ea  Wall slide with lat stretch x20 R  Standing IYT 2# x8 bilat   T raise 2# x10  Pt edu: DC and strengthening      NR 17'  GH row GTB x30  GH ext GTB x30  Serratus punch GTB x20   Standing bilat shoulder horiz abd RTB 2x15  Standing bilat shoulder ER RTB 2x15   Y raise up the wall x20 NR 27'  GH row BlackTB x20  GH ext BlueTB x20 R  90/90 IR RTB R 2x10   90/90 ER RTB 2x10 R  Shoulder circles 1# x30 ea direction R  Bent over gh ext 2x10 2#  R  Bent over gh horiz abd  2x10 2# R  Bent over low trap raise 2x10 1# R NR 30'  Rhythmic stab with 2# mb on wall cw/ccw 3x30\" ea R  Wall clocks YTB x8 bilat  PNF D1 ext GTB 2x10 R  PNF D2 flex RTB 2x10 R  Cone taps (3 cones with break in between) 1# x5 R, 2 taps then break x5; 3 taps then break x5, 5 taps x5  Bent over gh ext 2x10 3#  R  Bent over gh horiz abd 2x10 3# R  Bent over low trap raise 2x10 3# R NR 17'  PNF D1 ext RTB x20 R  PNF D2 flex RTB x20 R  Bent over IYT 1# x8, 2# x8 bilat  Low trap setting RTB around wrists 2x10  NR 17'  PNF D1 ext GTB x20 R  PNF D2 flex RTB x20 R  Body blade 2x30\" ea R  - ER/IR arm at side elbow flexed  - abd/add arm at side elbow extended   - flex/ext arm flex to about 10 deg    MT 10'  STM along pecs, biceps tendon, and parascap mm R  Gentle AP gh glide  Gentle gh traction   Manual nerve glides radial and median   R arm      MTx10'  STM R UT LS and supraspinatus           HEP:   - Seated Scapular Retraction  - 5-6 x daily - 7 x weekly - 10 reps  - Doorway Pec Stretch at 90 Degrees Abduction  - 2-3 x daily - 7 x weekly - 10 reps - 10 sec hold  - Radial Nerve Gliding: 's Tip  - 1 x daily - 7 x weekly - 10 reps  - Median Nerve Flossing - Tray  - 1 x daily - 7 x weekly - 10 reps  - Shoulder External Rotation and Scapular Retraction with Resistance  - 1 x daily - 7 x weekly - 2 sets - 10 reps  - Standing Shoulder Horizontal Abduction with Resistance  - 1 x daily - 7 x weekly - 2 sets - 10 reps  - Low Trap Setting at Wall  - 1 x daily - 7 x weekly - 2 sets - 10 reps    Charges: 1 TE 1 NR 1MT       Total Timed Treatment: 42 min  Total Treatment Time: 42 min

## 2024-03-21 ENCOUNTER — OFFICE VISIT (OUTPATIENT)
Dept: PHYSICAL THERAPY | Facility: HOSPITAL | Age: 39
End: 2024-03-21
Payer: MEDICAID

## 2024-03-21 PROCEDURE — 97112 NEUROMUSCULAR REEDUCATION: CPT

## 2024-03-21 PROCEDURE — 97110 THERAPEUTIC EXERCISES: CPT

## 2024-03-21 PROCEDURE — 97140 MANUAL THERAPY 1/> REGIONS: CPT

## 2024-03-29 NOTE — PROGRESS NOTES
Discharge Summary  Pt has attended 11 visits in Physical Therapy.    Diagnosis:   Acute pain of right shoulder (M25.511)       Referring Provider: Barak  Date of Evaluation:    1/16/2024    Precautions:  None Next MD visit:   none scheduled  Date of Surgery: n/a     Insurance Primary/Secondary: [employer]OSS MEDICAID / N/A     # Auth Visits: 11            Subjective: Pt can still feel something in the right shoulder but it doesn't bother her.     Pain: 2-3/10 R shoulder      Objective:  see rx flowsheet    AROM: (* denotes performed with pain)  Gross shoulder ROM: WFL  3/8/24: slight pain with functional IR and abduction (end range)   4/1/24: some discomfort with end range shoulder abduction 5/10 R      Assessment: Session was spent reviewing exercises to complete at home for independent management of symptoms and for general UE strengthening. Provided resistance bands and extensive HEP to utilize on her own. Encouraged pt to call if she has remaining questions or concerns.      Goals:   Goals: (to be met in 8-10 visits)   Pt will report improved ability to sleep without waking due to shoulder pain met  Pt will improve pain free shoulder flexion AROM to be able to reach into overhead cabinets without pain or restriction met  Pt will improve pain free shoulder abduction AROM to improve ability to don deodorant, don/doff shirts, and wash hair improved  Pt will increase pain free shoulder AROM ER to be able to reach and fasten seatbelt met  Pt will increase pain free shoulder AROM IR to be able to reach in back pocket, tuck in shirt, and turn steering wheel without pain  Pt will improve shoulder strength throughout to 4+/5 to improve function with baking met  Pt will demonstrate increased mid/low trap strength to 4+/5 to promote improved shoulder mechanics and stabilization with lifting and reaching met  Pt will be independent and compliant with comprehensive HEP to maintain progress achieved in PT met    Carlton  Outcome Score  Score: 56.82 % (1/16/2024 12:39 PM)    Post QuickDASH Outcome Score  Post Score: 9.09 % (4/1/2024  2:45 PM)    47.73 % improvement    Plan: DC from PT, continue with HEP. Call if there are any remaining questions or concerns.     Patient/Family/Caregiver was advised of these findings, precautions, and treatment options and has agreed to actively participate in planning and for this course of care.    Thank you for your referral. If you have any questions, please contact me at Dept: [phone].    Sincerely,  Electronically signed by therapist: Lj Alexis, PT     Certification From: 3/29/2024  To:6/27/2024    Date: 2/12/24  Tx#: 7/8-10 Date: 2/19/24  Tx#: 8/11 auth Date: 3/8/24  Tx#: 9/11 (auth) Date: 3/21/24  Tx#: 10/11 (Auth) Date: 4/1/24  Tx#: 11/11   TE 15'  Discussion on need for PT, scheduling, and advice- answering pt questions  1# cabinet reaches 2x10 R  Bicep curl 5# 2x10 R (focus on slow eccentric lowering) TE 12'  UBE fwd/bwd 3' ea  Bicep curl (GTB facing away) 2-3x10 R GTB (focus on eccentric lowering)   Supinated to pronated bicep curl x20 R 3# TE 23'  Gross reassessment and discussion regarding POC  UBE fwd/bwd 3' ea  Standing IYT 1# x8 bilat TE 15'  UBE fwd/bwd 3' ea  Wall slide with lat stretch x20 R  Standing IYT 2# x8 bilat   T raise 2# x10  Pt edu: DC and strengthening    TE 44'  UBE fwd/bwd 3' ea  Reassessment   Reviewing exercises to complete at home and gym   - reviewing all exercises on HEP, 2x10 ea (see HEP below)   In addition to HEP: attempted overhead press without weight and with 3#  Pt edu: HEP, benefits and side effects of cortisone injection vs oral steroids, prognosis      NR 27'  GH row BlackTB x20  GH ext BlueTB x20 R  90/90 IR RTB R 2x10   90/90 ER RTB 2x10 R  Shoulder circles 1# x30 ea direction R  Bent over gh ext 2x10 2#  R  Bent over gh horiz abd 2x10 2# R  Bent over low trap raise 2x10 1# R NR 30'  Rhythmic stab with 2# mb on wall cw/ccw 3x30\" ea R  Wall  clocks YTB x8 bilat  PNF D1 ext GTB 2x10 R  PNF D2 flex RTB 2x10 R  Cone taps (3 cones with break in between) 1# x5 R, 2 taps then break x5; 3 taps then break x5, 5 taps x5  Bent over gh ext 2x10 3#  R  Bent over gh horiz abd 2x10 3# R  Bent over low trap raise 2x10 3# R NR 17'  PNF D1 ext RTB x20 R  PNF D2 flex RTB x20 R  Bent over IYT 1# x8, 2# x8 bilat  Low trap setting RTB around wrists 2x10  NR 17'  PNF D1 ext GTB x20 R  PNF D2 flex RTB x20 R  Body blade 2x30\" ea R  - ER/IR arm at side elbow flexed  - abd/add arm at side elbow extended   - flex/ext arm flex to about 10 deg        MTx10'  STM R UT LS and supraspinatus            HEP:   - Seated Scapular Retraction  - 5-6 x daily - 7 x weekly - 10 reps  - Doorway Pec Stretch at 90 Degrees Abduction  - 2-3 x daily - 7 x weekly - 10 reps - 10 sec hold  - Radial Nerve Gliding: 's Tip  - 1 x daily - 7 x weekly - 10 reps  - Median Nerve Flossing - Tray  - 1 x daily - 7 x weekly - 10 reps  - Shoulder External Rotation and Scapular Retraction with Resistance  - 1 x daily - 7 x weekly - 2 sets - 10 reps  - Standing Shoulder Horizontal Abduction with Resistance  - 1 x daily - 7 x weekly - 2 sets - 10 reps  - Low Trap Setting at Wall  - 1 x daily - 7 x weekly - 2 sets - 10 reps    Exercises 4/1/24  - Standing Shoulder Row with Anchored Resistance  - 1 x daily - 2 sets - 10 reps  - Shoulder extension with resistance - Neutral  - 1 x daily - 2 sets - 10 reps  - Shoulder External Rotation and Scapular Retraction with Resistance  - 1 x daily - 2 sets - 10 reps  - Standing Shoulder Horizontal Abduction with Resistance  - 1 x daily - 2 sets - 10 reps  - Standing Bicep Curls Supinated with Dumbbells  - 1 x daily - 2 sets - 10 reps  - Standing Pronated Elbow Flexion with Dumbbell  - 1 x daily - 2 sets - 10 reps  - Supine Chest Press with Dumbbells  - 1 x daily - 2 sets - 10 reps  - Supine Shoulder Flexion Extension Full Range AROM  - 1 x daily - 2 sets - 10 reps  - Supine  Chest Flys  - 1 x daily - 2 sets - 10 reps  - Single Arm Bent Over Shoulder Horizontal Abduction with Dumbbell - Palm Down  - 1 x daily - 2 sets - 10 reps  - Single Arm Bent Over Shoulder Horizontal Abduction with Dumbbell - Thumb Up  - 1 x daily - 2 sets - 10 reps  - Single Arm Bent Over Shoulder Extension with Dumbbell  - 1 x daily - 2 sets - 10 reps  - Standing Shoulder Flexion to 90 Degrees with Dumbbells  - 1 x daily - 2 sets - 10 reps  - Scaption with Dumbbells  - 1 x daily - 2 sets - 10 reps  - Shoulder Abduction with Dumbbells - Thumbs Up  - 1 x daily - 2 sets - 10 reps  -Overhead press 2x10    Charges: 3 TE       Total Timed Treatment: 44 min  Total Treatment Time: 44 min

## 2024-04-01 ENCOUNTER — OFFICE VISIT (OUTPATIENT)
Dept: PHYSICAL THERAPY | Facility: HOSPITAL | Age: 39
End: 2024-04-01
Attending: INTERNAL MEDICINE
Payer: MEDICAID

## 2024-04-01 PROCEDURE — 97110 THERAPEUTIC EXERCISES: CPT

## 2024-04-05 RX ORDER — AMOXICILLIN AND CLAVULANATE POTASSIUM 875; 125 MG/1; MG/1
1 TABLET, FILM COATED ORAL 2 TIMES DAILY
Qty: 20 TABLET | Refills: 0 | Status: SHIPPED | OUTPATIENT
Start: 2024-04-05 | End: 2024-04-15

## 2024-04-16 NOTE — LETTER
ASTHMA ACTION PLAN for Karolyn Jackson     : 1985     Date: 2019  Provider:  Beau Hickman MD  Phone for doctor or clinic: [address] [facility].  [place] [id number] [phone]           [facility] follow up care

## 2024-05-14 ENCOUNTER — OFFICE VISIT (OUTPATIENT)
Dept: INTERNAL MEDICINE CLINIC | Facility: CLINIC | Age: 39
End: 2024-05-14

## 2024-05-14 VITALS
HEART RATE: 80 BPM | SYSTOLIC BLOOD PRESSURE: 120 MMHG | BODY MASS INDEX: 33.91 KG/M2 | OXYGEN SATURATION: 100 % | HEIGHT: 64 IN | WEIGHT: 198.63 LBS | DIASTOLIC BLOOD PRESSURE: 82 MMHG

## 2024-05-14 DIAGNOSIS — R06.83 SNORING: Primary | ICD-10-CM

## 2024-05-14 DIAGNOSIS — E66.1 CLASS 1 DRUG-INDUCED OBESITY WITH BODY MASS INDEX (BMI) OF 34.0 TO 34.9 IN ADULT, UNSPECIFIED WHETHER SERIOUS COMORBIDITY PRESENT: ICD-10-CM

## 2024-05-14 DIAGNOSIS — E55.9 VITAMIN D DEFICIENCY: ICD-10-CM

## 2024-05-14 DIAGNOSIS — Z76.89 SLEEP CONCERN: ICD-10-CM

## 2024-05-14 PROCEDURE — 99215 OFFICE O/P EST HI 40 MIN: CPT | Performed by: INTERNAL MEDICINE

## 2024-05-14 NOTE — PROGRESS NOTES
Leilani Jackson is a 39 year old female.  Chief complaint:  weight loss management and obesity related comorbidities    HPI:     Leilani Jackson is a 39 year old female new to our office today.   with PMH as listed below here for weight management     Weight history:    Wt Readings from Last 12 Encounters:   05/14/24 198 lb 9.6 oz (90.1 kg)   03/04/24 205 lb 9.6 oz (93.3 kg)   02/20/24 208 lb 12.8 oz (94.7 kg)   11/29/23 199 lb 12.8 oz (90.6 kg)   08/21/23 195 lb 9.6 oz (88.7 kg)   04/26/23 199 lb (90.3 kg)   05/31/22 191 lb 9.6 oz (86.9 kg)   05/23/22 205 lb (93 kg)   03/23/22 205 lb 4 oz (93.1 kg)   03/01/22 208 lb (94.3 kg)   01/14/22 204 lb (92.5 kg)   01/07/22 207 lb 3.2 oz (94 kg)     Weight:  Max weight: 208   Lowest weight:160 lbs     Triggers for weight gain? Stress eating   Snacking       Previous weight loss programs?  Keto diet       Previous weight loss medications? Phentermine     Mounjaro for 5 weeks     Do you get up to eat at night? No    Typical diet   Breakfast: Lunch: Dinner: Snacks:   In the morning   Coffee and something sweet   Was doing date before with nuts   Cheese sandwich    Eggs   Yogurt   [place]    Fajita   Sand wish      Chips  Bisuits   Fruits      Stressed when she is working   Trying not to eat at night       She is planing to get pregnant     Drinks a lot of water   Cannot tolerate milk any more       Sweet tooth: No  Soda or Juice: No  Activity: no   Sleep: 7-8 hours   Snores at night   Wakes up with headache       Stress: 8-10/10     Significant PMH/ or weight Related Co morbidities :   History of thyroid disease: No  History of thyroid nodule: No  Family history of thyroid cancer: No  History of pancreatitis : No  History of kidney stone: No   History of glaucoma: No  History of heart disease: No  Seizure: No        Current Outpatient Medications   Medication Sig Dispense Refill    Ferrous Sulfate 324 (65 Fe) MG Oral Tab EC Take 1 tablet by mouth in the morning and 1 tablet before  bedtime. 180 tablet 1    guaiFENesin-codeine (CHERATUSSIN AC) 100-10 MG/5ML Oral Solution Take 5 mL by mouth every 6 (six) hours as needed. 118 mL 0    amoxicillin clavulanate 875-125 MG Oral Tab Take 1 tablet by mouth 2 (two) times daily. (Patient not taking: Reported on 2/20/2024)      levoFLOXacin 500 MG Oral Tab Take 1 tablet (500 mg total) by mouth daily. (Patient not taking: Reported on 2/20/2024)      metFORMIN 500 MG Oral Tab Take 1 tablet (500 mg total) by mouth daily with dinner. (Patient not taking: Reported on 2/20/2024)      Nebulizers (CryptoSeal AEROSOL DELIVERY SYSTEM) Does not apply Misc Take 1 Bottle by mouth As Directed. (Patient not taking: Reported on 2/20/2024)      Budesonide-Formoterol Fumarate 160-4.5 MCG/ACT Inhalation Aerosol Inhale 2 puffs into the lungs 2 (two) times daily. 30.6 g 0    fluticasone propionate 50 MCG/ACT Nasal Suspension 2 sprays by Nasal route daily. 16 g 0    albuterol 108 (90 Base) MCG/ACT Inhalation Aero Soln Inhale 2 puffs into the lungs every 6 (six) hours as needed for Wheezing or Shortness of Breath. 2 each 2    nystatin 294758 UNIT/ML Mouth/Throat Suspension Take 5 mL (500,000 Units total) by mouth 4 (four) times daily. (Patient not taking: Reported on 2/20/2024) 60 mL 0    oseltamivir 75 MG Oral Cap       dicyclomine 10 MG Oral Cap Take 1 capsule (10 mg total) by mouth 3 (three) times daily as needed. (Patient not taking: Reported on 2/20/2024) 30 capsule 2    Clindamycin Phosphate 1 % External Foam Apply 1 Application topically 2 (two) times daily. (Patient not taking: Reported on 2/20/2024) 100 g 0    amoxicillin 500 MG Oral Cap Take 1 capsule (500 mg total) by mouth 2 (two) times daily. (Patient not taking: Reported on 2/20/2024)      Meloxicam 15 MG Oral Tab Take 1 tablet (15 mg total) by mouth daily. 30 tablet 0    ERGOCALCIFEROL 1.25 MG (24496 UT) Oral Cap TAKE 1 CAPSULE BY MOUTH 1 TIME A WEEK FOR 12 DOSES 12 capsule 0    HYDROcodone-acetaminophen 5-325 MG Oral  Tab Take 1-2 tablets by mouth every 4 (four) hours as needed for Pain. (Patient not taking: Reported on 2/20/2024) 40 tablet 0    docusate sodium 100 MG Oral Cap Take 1 capsule (100 mg total) by mouth 2 (two) times daily. (Patient not taking: Reported on 2/20/2024) 40 capsule 0    ondansetron (ZOFRAN) 4 mg tablet Take 1 tablet (4 mg total) by mouth every 8 (eight) hours as needed for Nausea. (Patient not taking: Reported on 2/20/2024) 30 tablet 0    CETIRIZINE HCL OR Take by mouth as needed.      methylPREDNISolone (MEDROL) 4 MG Oral Tablet Therapy Pack As directed. (Patient not taking: Reported on 2/20/2024) 1 each 0    guaiFENesin-codeine (CHERATUSSIN AC) 100-10 MG/5ML Oral Solution Take 5 mL by mouth 4 (four) times daily as needed for cough or congestion. (Patient not taking: Reported on 2/20/2024) 118 mL 0    Oxymetazoline HCl (NASAL SPRAY NA) by Nasal route as needed.      Multiple Vitamin (MULTI-VITAMIN DAILY) Oral Tab Take by mouth.         Past Medical History:    Asthma (HCC)    COVID    Runny nose, asthma symptoms. No hospitalization    Hx of motion sickness     No past surgical history on file.    Patient Active Problem List   Diagnosis    Macromastia    High serum parathyroid hormone (PTH)    Cyst of ovary    Endometrial thickening on ultrasound    Hypercalcemia    Mild asthma without complication (HCC)    Allergic rhinitis    Urinary symptom or sign    Abdominal cramps    Elevated parathyroid hormone    Vitamin D deficiency    S/P bilateral breast reduction    Iron deficiency    Hyperparathyroidism (HCC)    Abdominal pain    Shoulder pain    Oral thrush    Influenza    Obesity    Trying to get pregnant    Varicose veins    Pain in both lower extremities    Need for vaccination               REVIEW OF SYSTEMS:   A comprehensive 10 point review of systems was completed.  Pertinent positives and negatives noted in the HPI      PHYSICAL EXAM:   /82   Pulse 80   Ht 5' 4\" (1.626 m)   Wt 198 lb 9.6 oz  (90.1 kg)   LMP 08/01/2023 (Approximate)   SpO2 100%   BMI 34.09 kg/m²   [unfilled]    Wt Readings from Last 6 Encounters:   05/14/24 198 lb 9.6 oz (90.1 kg)   03/04/24 205 lb 9.6 oz (93.3 kg)   02/20/24 208 lb 12.8 oz (94.7 kg)   11/29/23 199 lb 12.8 oz (90.6 kg)   08/21/23 195 lb 9.6 oz (88.7 kg)   04/26/23 199 lb (90.3 kg)        GENERAL: well developed, well nourished,in no apparent distress  NECK: supple,no adenopathy  LUNGS: clear to auscultation  CARDIO: RRR without murmur  NEURO: no gross deficits     No orders of the defined types were placed in this encounter.    No orders of the defined types were placed in this encounter.      ASSESSMENT/PLAN:       ICD-10-CM    1. Snoring  R06.83 Pulmonary Referral - In Network      2. Vitamin D deficiency  E55.9 Pulmonary Referral - In Network      3. Class 1 drug-induced obesity with body mass index (BMI) of 34.0 to 34.9 in adult, unspecified whether serious comorbidity present  E66.1 Pulmonary Referral - In Network    Z68.34       4. Sleep concern  Z76.89 Pulmonary Referral - In Network         Reviewed  Readiness for Lifestyle change: 10/10, Interest in Medication: 5/10, Surgery interest: 0/10.    She is planing to get pregnant       Counseled on comprehensive weight loss plan including attention to nutrition, exercise and behavior/stress management for success.     Plan:  Advised the patient to follow low carb high protein diet   Advised to count carbs goal carbs is 50 g per day   Advised to increase protein goal is 90 g per day   Can add protein shakes   Increase water intake goal is 64 oz per day   Increase exercise cardio and weight resistance training, discussed the importance of exercise for weight loss goal is 150 min per week   don't eat at late night   Given meal plan   Discussed resources for low carb meal   Discussed the importance of sleep and reducing stress for weight loss  Given low carb meal plan   Goal weight loss is 9 lbs in 3 months   Would  like to start something that is safe to take in case if she gets pregnant   Advised to restart metformin 1 tab bid x 2 weeks if tolerating well increase to 2 tabs BID   Referral to sleep specialist for evaluation of sleep / sleep study     Discussed:  -low carb high protein  -Limit carbohydrates  -Read nutrition labels and keep a food log  -drink a lot of water 65 oz of water per day  - Do not drink your calories (no regular pop, juice, high calorie coffee drinks, limit alcohol)  - Do not eat late at night  - Exercise- at least 3 times per week ( goal is 150 min/week)  -dietician referral: No  -Labs as ordered: No        Follow up with PCP as scheduled.   Follow up with Kishan Plunkett 1 mos      I spent 40 minutes at the day of the service seeing the patient, examination, reviewing labs, independently interpreting results, completing charting and counseling the patient and/or on coordination of care.  The diagnosis, prognosis, and general treatment was explained to the patient.   Please return to the clinic if you are having persistent or worsening symptoms   Kishan Plunkett MD,   Diplomate of the American Board of Internal Medicine  Diplomate of the American Board of Obesity Medicine

## 2024-05-14 NOTE — PATIENT INSTRUCTIONS
[facility]. We are excited that you are committed to improving your health and have invited our practice to be part of your journey. Our approach to the medical management of weight loss is similar to that of other chronic diseases, like asthma or high blood pressure. Treatment is tailored to your needs and may look different than someone else in our program. Weight-loss success is dependent on many factors, including your motivation and commitment to better health.      We are committed to your medical safety, particularly when prescribing weight-loss medications. Because we are physicians, we measure your success not only by “pounds lost” - we will also track improvements in your laboratory work, vital signs and quality of life.      Weight loss and maintenance can be a challenging endeavor. We want to celebrate your successes and support you if you encounter difficult times. Please don’t hesitate to ask us questions and share with us any struggles you may have. For some patients, there may be many attempts at weight loss before lasting success is found, but we can help you find your success!      Sincerely,     Kishan Plunkett MD  American Board of Obesity Medicine Diplomate  American Board of Internal Medicine Diplomate                                                                  Weight Loss Tips    Cut down on sugar and starches.    Ok to eat healthy fat ( avocado, nuts,eggs, olive oil and coconut oil)    Eat protein with each meal target 15-30 G of protein with each meal: Protein reduces appetite, cravings and hunger. It increases muscle mass and subsequently metabolism and fat burning.     If not able to meet your protein need, you can get a protein shake. Choose one with around 25 g of protein and low carb less than 5 g ( e.g: premier protein, orgain Clean Protein, whey protein muscle milk)    Limit carbohydrates between 50g-100g / day    Limit processed food, eat  unprocessed food whenever you can.    Read nutrition labels    Drink a lot of water  at least 65 oz of water per day: Water is a natural appetite suppressant, increases calorie burning and help you to loose weight.    Eat slowly.    Do not drink your calories ( avoid soft drinks, juice, high calorie coffee drinks, limit alcohol) Also stay away from artificially sweetened beverages too it can cause weight gain.    Think about challenges and write it down to address it next visit.     Do not eat late at night.      Exercise goal for weight loss is 150 minutes per week.    Take a probiotic everyday ( e.g: cruz colon health brand, culturelle, VSL3, Lactobacillus Gasseri )     Use smart phone applications to track your progress/ carb intake e.g:( my fitnesspal, loose it, Carb Manager )    Have a good night sleep aim for 7-8 hours    Reduce your stress level.    Helpful websites: www.dietAlector.Image [employer] Media( search visual low-carb guides dietdoctor), or www.Modria.[employer].    Helpful exercise apps( Stretchr hong)   Helpful fasting apps :( Zero)         Foods to avoid :  Sugar: sweets, ice cream, fruit juices, candy and food that is high in added sugar.  Highly processed food  Refined grains: Rice, wheat, bread, cereal and pasta.  Starchy vegetables: Potatoes and corn     Low Carb food :  Meat: lamb, steak, chicken, turkey  Fish and sea food   Eggs  Plain yogurt   Cheese   Fat and oils   Fruits: berries are the best           How I Plan to Lose Weight      Goal setting is the “how” of weight loss. Motivators are the “why.” When setting goals, utilize the SMART technique:    SMART Technique Example   Specific Who, what, where, when, how… “I want to lose 10 pounds in two months.”   Measureable How will you track? 10 pounds in 8 weeks = 1.25 pounds/week   Attainable Resources you have available, previous experience “I have been able to do this before, and now I have new tools from my doctor!”   Relevant Why this goal is important  Review your motivators above   Timely Set benchmarks and deadlines “Focusing for two month intervals works for me.”         Even if your lose 0.5 pound per week You will still lose 26                       pounds by this time next year!

## 2024-06-30 DIAGNOSIS — E55.9 VITAMIN D DEFICIENCY: ICD-10-CM

## 2024-07-01 RX ORDER — ERGOCALCIFEROL 1.25 MG/1
CAPSULE ORAL
Qty: 12 CAPSULE | Refills: 0 | Status: SHIPPED | OUTPATIENT
Start: 2024-07-01

## 2024-07-17 ENCOUNTER — TELEPHONE (OUTPATIENT)
Dept: INTERNAL MEDICINE CLINIC | Facility: CLINIC | Age: 39
End: 2024-07-17

## 2024-07-17 ENCOUNTER — OFFICE VISIT (OUTPATIENT)
Dept: INTERNAL MEDICINE CLINIC | Facility: CLINIC | Age: 39
End: 2024-07-17
Payer: MEDICAID

## 2024-07-17 VITALS
DIASTOLIC BLOOD PRESSURE: 82 MMHG | SYSTOLIC BLOOD PRESSURE: 110 MMHG | OXYGEN SATURATION: 100 % | HEART RATE: 79 BPM | WEIGHT: 206.38 LBS | HEIGHT: 64 IN | BODY MASS INDEX: 35.23 KG/M2

## 2024-07-17 DIAGNOSIS — M25.561 PAIN IN BOTH KNEES, UNSPECIFIED CHRONICITY: ICD-10-CM

## 2024-07-17 DIAGNOSIS — E55.9 VITAMIN D DEFICIENCY: ICD-10-CM

## 2024-07-17 DIAGNOSIS — M25.562 PAIN IN BOTH KNEES, UNSPECIFIED CHRONICITY: ICD-10-CM

## 2024-07-17 DIAGNOSIS — Z00.00 ANNUAL PHYSICAL EXAM: ICD-10-CM

## 2024-07-17 DIAGNOSIS — Z98.890 S/P BILATERAL BREAST REDUCTION: ICD-10-CM

## 2024-07-17 DIAGNOSIS — B37.0 ORAL THRUSH: ICD-10-CM

## 2024-07-17 DIAGNOSIS — E61.1 IRON DEFICIENCY: ICD-10-CM

## 2024-07-17 DIAGNOSIS — J45.909 MILD ASTHMA WITHOUT COMPLICATION, UNSPECIFIED WHETHER PERSISTENT (HCC): ICD-10-CM

## 2024-07-17 DIAGNOSIS — J45.901 EXACERBATION OF ASTHMA, UNSPECIFIED ASTHMA SEVERITY, UNSPECIFIED WHETHER PERSISTENT (HCC): ICD-10-CM

## 2024-07-17 DIAGNOSIS — M26.609 TMJ (TEMPOROMANDIBULAR JOINT SYNDROME): ICD-10-CM

## 2024-07-17 DIAGNOSIS — L65.9 HAIR LOSS: ICD-10-CM

## 2024-07-17 DIAGNOSIS — R25.2 LEG CRAMPS: ICD-10-CM

## 2024-07-17 DIAGNOSIS — R39.9 URINARY SYMPTOM OR SIGN: ICD-10-CM

## 2024-07-17 DIAGNOSIS — R68.84 JAW PAIN: Primary | ICD-10-CM

## 2024-07-17 LAB
ALBUMIN SERPL-MCNC: 4.5 G/DL (ref 3.2–4.8)
ALBUMIN/GLOB SERPL: 1.7 {RATIO} (ref 1–2)
ALP LIVER SERPL-CCNC: 61 U/L
ALT SERPL-CCNC: 8 U/L
ANION GAP SERPL CALC-SCNC: 8 MMOL/L (ref 0–18)
AST SERPL-CCNC: 18 U/L (ref ?–34)
BASOPHILS # BLD AUTO: 0.06 X10(3) UL (ref 0–0.2)
BASOPHILS NFR BLD AUTO: 0.7 %
BILIRUB SERPL-MCNC: 0.5 MG/DL (ref 0.3–1.2)
BUN BLD-MCNC: 17 MG/DL (ref 9–23)
BUN/CREAT SERPL: 26.2 (ref 10–20)
CALCIUM BLD-MCNC: 9.4 MG/DL (ref 8.7–10.4)
CHLORIDE SERPL-SCNC: 109 MMOL/L (ref 98–112)
CO2 SERPL-SCNC: 25 MMOL/L (ref 21–32)
CREAT BLD-MCNC: 0.65 MG/DL
DEPRECATED HBV CORE AB SER IA-ACNC: 22.8 NG/ML
DEPRECATED RDW RBC AUTO: 42.8 FL (ref 35.1–46.3)
EGFRCR SERPLBLD CKD-EPI 2021: 116 ML/MIN/1.73M2 (ref 60–?)
EOSINOPHIL # BLD AUTO: 0.14 X10(3) UL (ref 0–0.7)
EOSINOPHIL NFR BLD AUTO: 1.5 %
ERYTHROCYTE [DISTWIDTH] IN BLOOD BY AUTOMATED COUNT: 13.2 % (ref 11–15)
FASTING STATUS PATIENT QL REPORTED: YES
GLOBULIN PLAS-MCNC: 2.7 G/DL (ref 2–3.5)
GLUCOSE BLD-MCNC: 86 MG/DL (ref 70–99)
HCG SERPL QL: NEGATIVE
HCT VFR BLD AUTO: 41.9 %
HGB BLD-MCNC: 13.4 G/DL
IMM GRANULOCYTES # BLD AUTO: 0.02 X10(3) UL (ref 0–1)
IMM GRANULOCYTES NFR BLD: 0.2 %
IRON SATN MFR SERPL: 28 %
IRON SERPL-MCNC: 95 UG/DL
LYMPHOCYTES # BLD AUTO: 2.48 X10(3) UL (ref 1–4)
LYMPHOCYTES NFR BLD AUTO: 27 %
MAGNESIUM SERPL-MCNC: 1.9 MG/DL (ref 1.6–2.6)
MCH RBC QN AUTO: 29 PG (ref 26–34)
MCHC RBC AUTO-ENTMCNC: 32 G/DL (ref 31–37)
MCV RBC AUTO: 90.7 FL
MONOCYTES # BLD AUTO: 0.4 X10(3) UL (ref 0.1–1)
MONOCYTES NFR BLD AUTO: 4.4 %
NEUTROPHILS # BLD AUTO: 6.09 X10 (3) UL (ref 1.5–7.7)
NEUTROPHILS # BLD AUTO: 6.09 X10(3) UL (ref 1.5–7.7)
NEUTROPHILS NFR BLD AUTO: 66.2 %
OSMOLALITY SERPL CALC.SUM OF ELEC: 295 MOSM/KG (ref 275–295)
PLATELET # BLD AUTO: 285 10(3)UL (ref 150–450)
POTASSIUM SERPL-SCNC: 3.7 MMOL/L (ref 3.5–5.1)
PROT SERPL-MCNC: 7.2 G/DL (ref 5.7–8.2)
RBC # BLD AUTO: 4.62 X10(6)UL
SODIUM SERPL-SCNC: 142 MMOL/L (ref 136–145)
TIBC SERPL-MCNC: 343 UG/DL (ref 250–425)
TRANSFERRIN SERPL-MCNC: 230 MG/DL (ref 250–380)
TSI SER-ACNC: 1.45 MIU/ML (ref 0.55–4.78)
VIT D+METAB SERPL-MCNC: 25.1 NG/ML (ref 30–100)
WBC # BLD AUTO: 9.2 X10(3) UL (ref 4–11)

## 2024-07-17 PROCEDURE — 99214 OFFICE O/P EST MOD 30 MIN: CPT | Performed by: INTERNAL MEDICINE

## 2024-07-17 PROCEDURE — 84443 ASSAY THYROID STIM HORMONE: CPT | Performed by: INTERNAL MEDICINE

## 2024-07-17 PROCEDURE — 82306 VITAMIN D 25 HYDROXY: CPT | Performed by: INTERNAL MEDICINE

## 2024-07-17 PROCEDURE — 83540 ASSAY OF IRON: CPT | Performed by: INTERNAL MEDICINE

## 2024-07-17 PROCEDURE — 84703 CHORIONIC GONADOTROPIN ASSAY: CPT | Performed by: INTERNAL MEDICINE

## 2024-07-17 PROCEDURE — 84466 ASSAY OF TRANSFERRIN: CPT | Performed by: INTERNAL MEDICINE

## 2024-07-17 PROCEDURE — 82728 ASSAY OF FERRITIN: CPT | Performed by: INTERNAL MEDICINE

## 2024-07-17 PROCEDURE — 85025 COMPLETE CBC W/AUTO DIFF WBC: CPT | Performed by: INTERNAL MEDICINE

## 2024-07-17 PROCEDURE — 80053 COMPREHEN METABOLIC PANEL: CPT | Performed by: INTERNAL MEDICINE

## 2024-07-17 PROCEDURE — 83735 ASSAY OF MAGNESIUM: CPT | Performed by: INTERNAL MEDICINE

## 2024-07-17 RX ORDER — FERROUS SULFATE 324(65)MG
1 TABLET, DELAYED RELEASE (ENTERIC COATED) ORAL 2 TIMES DAILY
COMMUNITY
Start: 2024-06-30

## 2024-07-17 RX ORDER — FLUTICASONE PROPIONATE 50 MCG
2 SPRAY, SUSPENSION (ML) NASAL DAILY
Qty: 16 G | Refills: 0 | Status: SHIPPED | OUTPATIENT
Start: 2024-07-17

## 2024-07-17 RX ORDER — ALBUTEROL SULFATE 90 UG/1
2 AEROSOL, METERED RESPIRATORY (INHALATION) EVERY 6 HOURS PRN
Qty: 2 EACH | Refills: 2 | Status: SHIPPED | OUTPATIENT
Start: 2024-07-17

## 2024-07-17 RX ORDER — CYCLOBENZAPRINE HCL 10 MG
10 TABLET ORAL NIGHTLY PRN
Qty: 10 TABLET | Refills: 1 | Status: SHIPPED | OUTPATIENT
Start: 2024-07-17 | End: 2024-07-27

## 2024-07-17 RX ORDER — NAPROXEN 500 MG/1
500 TABLET ORAL 2 TIMES DAILY WITH MEALS
Qty: 14 TABLET | Refills: 0 | Status: SHIPPED | OUTPATIENT
Start: 2024-07-17 | End: 2024-07-24

## 2024-07-17 RX ORDER — FLUTICASONE PROPIONATE 50 MCG
2 SPRAY, SUSPENSION (ML) NASAL DAILY
Qty: 48 G | Refills: 0 | OUTPATIENT
Start: 2024-07-17

## 2024-07-17 NOTE — PROGRESS NOTES
Leilani Jackson is a 39 year old female.    Chief complaint: hair loss and pain in the jaw pain      HPI:     Leilani Jackson is a 39 year old pleasant female who presents for follow hair loss and left jaw pain       Hair loss   After coloring her hair 2 weeks ago   Has been going through a lot of stress too   No loss of patches of hair   Last Saturday     Started having pain in the left TMJ   More when she yawns  No swelling   No increased salivation   Discomfort   She does grind her teeth         She is seeing the dentist this Friday       Vitamin d def      Low iron     Still having knee pain did see ortho before   Still having right knee clicking and pain            Current Outpatient Medications   Medication Sig Dispense Refill    Ferrous Sulfate 324 (65 Fe) MG Oral Tab EC Take 1 tablet by mouth 2 (two) times daily. AT BEDTIME      ERGOCALCIFEROL 1.25 MG (16795 UT) Oral Cap TAKE 1 CAPSULE BY MOUTH 1 TIME A WEEK FOR 12 DOSES 12 capsule 0    guaiFENesin-codeine (CHERATUSSIN AC) 100-10 MG/5ML Oral Solution Take 5 mL by mouth every 6 (six) hours as needed. 118 mL 0    amoxicillin clavulanate 875-125 MG Oral Tab Take 1 tablet by mouth 2 (two) times daily. (Patient not taking: Reported on 2/20/2024)      levoFLOXacin 500 MG Oral Tab Take 1 tablet (500 mg total) by mouth daily. (Patient not taking: Reported on 2/20/2024)      metFORMIN 500 MG Oral Tab Take 1 tablet (500 mg total) by mouth daily with dinner. (Patient not taking: Reported on 2/20/2024)      Nebulizers (VIOS AEROSOL DELIVERY SYSTEM) Does not apply Misc Take 1 Bottle by mouth As Directed. (Patient not taking: Reported on 2/20/2024)      Budesonide-Formoterol Fumarate 160-4.5 MCG/ACT Inhalation Aerosol Inhale 2 puffs into the lungs 2 (two) times daily. 30.6 g 0    fluticasone propionate 50 MCG/ACT Nasal Suspension 2 sprays by Nasal route daily. 16 g 0    albuterol 108 (90 Base) MCG/ACT Inhalation Aero Soln Inhale 2 puffs into the lungs every 6 (six) hours as  needed for Wheezing or Shortness of Breath. 2 each 2    nystatin 491643 UNIT/ML Mouth/Throat Suspension Take 5 mL (500,000 Units total) by mouth 4 (four) times daily. (Patient not taking: Reported on 2/20/2024) 60 mL 0    oseltamivir 75 MG Oral Cap       dicyclomine 10 MG Oral Cap Take 1 capsule (10 mg total) by mouth 3 (three) times daily as needed. (Patient not taking: Reported on 2/20/2024) 30 capsule 2    Clindamycin Phosphate 1 % External Foam Apply 1 Application topically 2 (two) times daily. (Patient not taking: Reported on 2/20/2024) 100 g 0    amoxicillin 500 MG Oral Cap Take 1 capsule (500 mg total) by mouth 2 (two) times daily. (Patient not taking: Reported on 2/20/2024)      Meloxicam 15 MG Oral Tab Take 1 tablet (15 mg total) by mouth daily. 30 tablet 0    HYDROcodone-acetaminophen 5-325 MG Oral Tab Take 1-2 tablets by mouth every 4 (four) hours as needed for Pain. (Patient not taking: Reported on 2/20/2024) 40 tablet 0    docusate sodium 100 MG Oral Cap Take 1 capsule (100 mg total) by mouth 2 (two) times daily. (Patient not taking: Reported on 2/20/2024) 40 capsule 0    ondansetron (ZOFRAN) 4 mg tablet Take 1 tablet (4 mg total) by mouth every 8 (eight) hours as needed for Nausea. (Patient not taking: Reported on 2/20/2024) 30 tablet 0    CETIRIZINE HCL OR Take by mouth as needed.      methylPREDNISolone (MEDROL) 4 MG Oral Tablet Therapy Pack As directed. (Patient not taking: Reported on 2/20/2024) 1 each 0    guaiFENesin-codeine (CHERATUSSIN AC) 100-10 MG/5ML Oral Solution Take 5 mL by mouth 4 (four) times daily as needed for cough or congestion. (Patient not taking: Reported on 2/20/2024) 118 mL 0    Oxymetazoline HCl (NASAL SPRAY NA) by Nasal route as needed.      Multiple Vitamin (MULTI-VITAMIN DAILY) Oral Tab Take by mouth.        Past Medical History:    Asthma (HCC)    COVID    Runny nose, asthma symptoms. No hospitalization    Hx of motion sickness     No past surgical history on file.           Family History   Problem Relation Age of Onset    Cancer Mother     Other (lymph nodes) Mother         Cancer    Cancer Maternal Grandfather     Diabetes Paternal Grandfather      Patient Active Problem List   Diagnosis    Macromastia    High serum parathyroid hormone (PTH)    Cyst of ovary    Endometrial thickening on ultrasound    Hypercalcemia    Mild asthma without complication (HCC)    Allergic rhinitis    Urinary symptom or sign    Abdominal cramps    Elevated parathyroid hormone    Vitamin D deficiency    S/P bilateral breast reduction    Iron deficiency    Hyperparathyroidism (HCC)    Abdominal pain    Shoulder pain    Oral thrush    Influenza    Obesity    Trying to get pregnant    Varicose veins    Pain in both lower extremities    Need for vaccination    Sleep concern    Snoring       REVIEW OF SYSTEMS:   A comprehensive 10 point review of systems was completed.  Pertinent positives and negatives noted in the the HPI            EXAM:   /82   Pulse 79   Ht 5' 4\" (1.626 m)   Wt 206 lb 6.4 oz (93.6 kg)   LMP 08/01/2023 (Approximate)   SpO2 100%   BMI 35.43 kg/m²   GENERAL: well developed, well nourished,in no apparent distress  Hair thinning no hair loss I patches     SKIN: no rashes,no suspicious lesions  HEENT: atraumatic, normocephalic,ears and throat are clear  Some tenderness to TMJ left side   No palpable swelling in the neck area     NECK: supple,no adenopathy  LUNGS: clear to auscultation  CARDIO: RRR without murmur  GI: no masses, HSM or tenderness  EXTREMITIES: no cyanosis, clubbing or edema  NEURO: no gross deficits              Orders Placed This Encounter    Ferrous Sulfate 324 (65 Fe) MG Oral Tab EC     Sig: Take 1 tablet by mouth 2 (two) times daily. AT BEDTIME     No results found.         ASSESSMENT AND PLAN:       1. Jaw pain  Likely TMJ   Naprosyn bid   Flexeril at night as needed   - XR TMJ- TM JOINT BILATERAL  (CPT=70330); Future  - CBC With Differential With Platelet;  Future  - Comp Metabolic Panel (14); Future  - TSH W Reflex To Free T4; Future  - Vitamin D; Future  - Iron And Tibc; Future  - Ferritin; Future  - XR KNEE COMPLETE BILAT EM(CPT=73564-50); Future  - HCG, Beta Subunit, Qual [E]; Future  - CBC With Differential With Platelet  - Comp Metabolic Panel (14)  - TSH W Reflex To Free T4  - Vitamin D  - Iron And Tibc  - Ferritin  - HCG, Beta Subunit, Qual [E]    2. Pain in both knees, unspecified chronicity  Naprosyn   XR knee   - XR KNEE COMPLETE BILAT EM(CPT=73564-50); Future  - HCG, Beta Subunit, Qual [E]; Future  - HCG, Beta Subunit, Qual [E]    3. Hair loss  Will check blood work   Advised to start viviscal or Nutrafol   If persistent to see dermatology     - CBC With Differential With Platelet; Future  - Comp Metabolic Panel (14); Future  - TSH W Reflex To Free T4; Future  - Vitamin D; Future  - Iron And Tibc; Future  - Ferritin; Future    4. Iron deficiency  Iron and ferritin   - Vitamin D  - Iron And Tibc  - Ferritin  - HCG, Beta Subunit, Qual [E]    5. Vitamin D deficiency  Recheck vitamin d     - Vitamin D  - Iron And Tibc  - Ferritin    6. TMJ (temporomandibular joint syndrome)  Naprosyn   Flexeril as needed   Seeing the dentist on Friday   If she continues to have pain after completing the treatment to have X ray     7-leg cramps :complete us venous doppler     - XR TMJ- TM JOINT BILATERAL  (CPT=70330); Future     Please return to the clinic if you are having persistent symptoms. If worsening symptoms should go to the ER      I spent 30 minutes at the day of the service seeing the patient, examination, reviewing labs, independently interpreting results, completing charting and counseling the patient and/or on coordination of care.  The diagnosis, prognosis, and general treatment was explained to the patient.     Kishan Plunkett MD,   Diplomate of the American Board of Internal Medicine  Diplomate of the American Board of Obesity Medicine

## 2024-07-19 ENCOUNTER — TELEPHONE (OUTPATIENT)
Dept: INTERNAL MEDICINE CLINIC | Facility: CLINIC | Age: 39
End: 2024-07-19

## 2024-07-19 RX ORDER — ERGOCALCIFEROL 1.25 MG/1
50000 CAPSULE ORAL WEEKLY
Qty: 12 CAPSULE | Refills: 1 | Status: SHIPPED | OUTPATIENT
Start: 2024-07-19 | End: 2024-10-05

## 2024-07-19 RX ORDER — METHYLDOPA 500 MG
160 TABLET ORAL DAILY
Qty: 90 TABLET | Refills: 1 | Status: SHIPPED | OUTPATIENT
Start: 2024-07-19 | End: 2024-10-17

## 2024-07-19 NOTE — TELEPHONE ENCOUNTER
Patient called requesting a call to go over test results (looking for preg test results)      Informed her that Dr is not in the office till Monday     Patient stated she text the Dr as well     This will determine if she can get Xray at dentist & start medication

## 2024-07-22 ENCOUNTER — HOSPITAL ENCOUNTER (EMERGENCY)
Facility: HOSPITAL | Age: 39
Discharge: HOME | End: 2024-07-23
Attending: EMERGENCY MEDICINE
Payer: MEDICAID

## 2024-07-22 VITALS
TEMPERATURE: 97 F | OXYGEN SATURATION: 99 % | SYSTOLIC BLOOD PRESSURE: 127 MMHG | HEART RATE: 89 BPM | BODY MASS INDEX: 34.49 KG/M2 | HEIGHT: 64.17 IN | DIASTOLIC BLOOD PRESSURE: 89 MMHG | WEIGHT: 202 LBS | RESPIRATION RATE: 18 BRPM

## 2024-07-22 DIAGNOSIS — L53.9 SKIN ERYTHEMA: ICD-10-CM

## 2024-07-22 DIAGNOSIS — Z01.419 NORMAL VAGINAL EXAM: Primary | ICD-10-CM

## 2024-07-22 PROCEDURE — 99284 EMERGENCY DEPT VISIT MOD MDM: CPT

## 2024-07-23 RX ORDER — CHLORHEXIDINE GLUCONATE ORAL RINSE 1.2 MG/ML
15 SOLUTION DENTAL 2 TIMES DAILY
Qty: 473 ML | Refills: 0 | Status: SHIPPED | OUTPATIENT
Start: 2024-07-23

## 2024-07-23 NOTE — ED INITIAL ASSESSMENT (HPI)
Pt presents to ed for eval-g. Pt states she cannot find her tampon, states she was making a bowel movement and is unsure if it came out when she was pushing.

## 2024-07-24 NOTE — ED PROVIDER NOTES
Patient Seen in: [facility]      History     Chief Complaint   Patient presents with    Eval-G     Stated Complaint: Eval-G, does'nt recall if tampon came off or not    Subjective:   HPI    39 year old female who presents for possible vaginal fb and separate concern for skin redness on R inner thigh. Pt thinks she left a tampon in while having BM, states she sprayed herself with bidet and then flushed. She then remembered that the tampon was still in, tried to feel for it and could not find it. She is not sure if it came out and she flushed it, she did not look in the toilet. No vaginal pain or discomfort. Separately she notes skin redness to R inner thigh. She states she gets little pimples on low back/buttock/thigh throughout her life, does not shave there. No fever/chills or dc from the area.     Objective:   Past Medical History:    Asthma (HCC)    COVID    Runny nose, asthma symptoms. No hospitalization    Hx of motion sickness              History reviewed. No pertinent surgical history.             Social History     Socioeconomic History    Marital status:    Tobacco Use    Smoking status: Never    Smokeless tobacco: Never   Vaping Use    Vaping status: Never Used   Substance and Sexual Activity    Alcohol use: No    Drug use: No    Sexual activity: Yes              Review of Systems    Positive for stated Chief Complaint: Eval-G    Other systems are as noted in HPI.  Constitutional and vital signs reviewed.      All other systems reviewed and negative except as noted above.    Physical Exam     ED Triage Vitals [07/22/24 2120]   /89   Pulse 89   Resp 18   Temp 97.4 °F (36.3 °C)   Temp src Temporal   SpO2 99 %   O2 Device None (Room air)       Current Vitals:   Vital Signs  BP: 127/89  Pulse: 89  Resp: 18  Temp: 97.4 °F (36.3 °C)  Temp src: Temporal    Oxygen Therapy  SpO2: 99 %  O2 Device: None (Room air)            Physical Exam  Vitals and nursing note reviewed.  Exam conducted with a chaperone present.   Constitutional:       General: She is not in acute distress.     Appearance: Normal appearance. She is well-developed. She is not ill-appearing or diaphoretic.   HENT:      Head: Normocephalic and atraumatic.   Eyes:      Conjunctiva/sclera: Conjunctivae normal.      Pupils: Pupils are equal, round, and reactive to light.   Cardiovascular:      Rate and Rhythm: Normal rate and regular rhythm.      Heart sounds: Normal heart sounds. No murmur heard.  Pulmonary:      Effort: Pulmonary effort is normal. No respiratory distress.      Breath sounds: Normal breath sounds. No wheezing.   Abdominal:      General: There is no distension.      Palpations: Abdomen is soft.      Tenderness: There is no abdominal tenderness. There is no guarding.   Genitourinary:     General: Normal vulva.      Exam position: Supine.      Labia:         Right: No rash, tenderness or lesion.         Left: No rash, tenderness or lesion.       Vagina: Normal. No signs of injury. No vaginal discharge or erythema.      Cervix: No cervical motion tenderness, discharge, friability or erythema.      Uterus: Not tender.       Adnexa: Right adnexa normal and left adnexa normal.        Right: No mass or tenderness.          Left: No mass or tenderness.        Comments: On speculum exam and bimanual exam there is no fb noted. No foul odor or pain with exam.   Musculoskeletal:         General: No tenderness. Normal range of motion.      Cervical back: Normal range of motion and neck supple.   Skin:     General: Skin is warm and dry.          Neurological:      Mental Status: She is alert and oriented to person, place, and time.   Psychiatric:         Behavior: Behavior normal.             ED Course   Labs Reviewed - No data to display         MDM      Pulse Ox: 99%, Normal, RA    Medications - No data to display          - no obvious vaginal fb palpated or visualized, suspect it came out while she was using the  bathroom. I discussed reasons to return including foul vaginal dc, fever/chills, pelvic pain.   - folliculitis - pt will monitor for any further signs of infection, can use chlorhexadine wash weekly on skin to prevent infection.     Disposition and Plan     Clinical Impression:  1. Normal vaginal exam    2. Skin erythema         Disposition:  Discharge  7/23/2024  1:04 am    Follow-up:  Kishan Plunkett MD  [address] [place] [address]  [phone]    Schedule an appointment as soon as possible for a visit  For wound re-check          Medications Prescribed:  Discharge Medication List as of 7/23/2024  1:18 AM        START taking these medications    Details   chlorhexidine gluconate 0.12 % Mouth/Throat Solution Use as directed 15 mL in the mouth or throat 2 (two) times daily., Normal, Disp-473 mL, R-0

## 2024-09-18 RX ORDER — FLUTICASONE PROPIONATE 50 MCG
2 SPRAY, SUSPENSION (ML) NASAL DAILY
Qty: 16 G | Refills: 0 | Status: SHIPPED | OUTPATIENT
Start: 2024-09-18

## 2024-10-09 ENCOUNTER — OFFICE VISIT (OUTPATIENT)
Dept: INTERNAL MEDICINE CLINIC | Facility: CLINIC | Age: 39
End: 2024-10-09
Payer: MEDICAID

## 2024-10-09 VITALS
SYSTOLIC BLOOD PRESSURE: 110 MMHG | DIASTOLIC BLOOD PRESSURE: 84 MMHG | HEIGHT: 64.17 IN | BODY MASS INDEX: 34.01 KG/M2 | HEART RATE: 85 BPM | OXYGEN SATURATION: 100 % | WEIGHT: 199.19 LBS

## 2024-10-09 DIAGNOSIS — Z78.9 TRYING TO GET PREGNANT: ICD-10-CM

## 2024-10-09 DIAGNOSIS — E66.9 OBESITY (BMI 30-39.9): ICD-10-CM

## 2024-10-09 DIAGNOSIS — E61.1 IRON DEFICIENCY: Primary | ICD-10-CM

## 2024-10-09 DIAGNOSIS — E55.9 VITAMIN D DEFICIENCY: ICD-10-CM

## 2024-10-09 PROCEDURE — 90656 IIV3 VACC NO PRSV 0.5 ML IM: CPT | Performed by: INTERNAL MEDICINE

## 2024-10-09 PROCEDURE — 90471 IMMUNIZATION ADMIN: CPT | Performed by: INTERNAL MEDICINE

## 2024-10-09 PROCEDURE — 99214 OFFICE O/P EST MOD 30 MIN: CPT | Performed by: INTERNAL MEDICINE

## 2024-10-09 RX ORDER — IBUPROFEN 800 MG/1
800 TABLET, FILM COATED ORAL EVERY 6 HOURS PRN
COMMUNITY
Start: 2024-08-01

## 2024-10-09 RX ORDER — METFORMIN HYDROCHLORIDE 750 MG/1
750 TABLET, EXTENDED RELEASE ORAL 2 TIMES DAILY WITH MEALS
Qty: 180 TABLET | Refills: 0 | Status: SHIPPED | OUTPATIENT
Start: 2024-10-09 | End: 2025-01-07

## 2024-10-09 RX ORDER — LETROZOLE 2.5 MG/1
2.5 TABLET, FILM COATED ORAL DAILY
COMMUNITY
Start: 2024-07-18

## 2024-10-09 NOTE — PROGRESS NOTES
Leilani Jackson is a 39 year old female.    Chief complaint:weight loss follow up , medication refill, therapeutic drug monitoring    HPI:   LEILANI here for follow up on weight loss   Wt Readings from Last 12 Encounters:   10/09/24 199 lb 3.2 oz (90.4 kg)   07/22/24 202 lb (91.6 kg)   07/17/24 206 lb 6.4 oz (93.6 kg)   05/14/24 198 lb 9.6 oz (90.1 kg)   03/04/24 205 lb 9.6 oz (93.3 kg)   02/20/24 208 lb 12.8 oz (94.7 kg)   11/29/23 199 lb 12.8 oz (90.6 kg)   08/21/23 195 lb 9.6 oz (88.7 kg)   04/26/23 199 lb (90.3 kg)   05/31/22 191 lb 9.6 oz (86.9 kg)   05/23/22 205 lb (93 kg)   03/23/22 205 lb 4 oz (93.1 kg)     Starting weight: 208  Total weight loss: 9 lbs     Medication: not taking metformin       Typical diet   Breakfast: Lunch: Dinner: Snacks:   Coffee and date and almonds   Eggs   Keto toast   Eggs   Labna avocado  Sometimes doesn't eat lunch  Trying not to eat at night   Fruits   Some seeds    Chips   Seeds   Popsicles        Doesn't log in the food     Soda/ juice/ alcohol: sparkling water  Diet soda: 1-2 times per week    Water intake: adequate  Exercise: more active   But no exercise     Side effect of medication: NA     Sleeps for 7 hours     Getting letrozol for pregnancy planning           History of iron def   Not taking meds regularly         Current Outpatient Medications   Medication Sig Dispense Refill    letrozole 2.5 MG Oral Tab Take 1 tablet (2.5 mg total) by mouth daily.      ibuprofen 800 MG Oral Tab Take 1 tablet (800 mg total) by mouth every 6 (six) hours as needed for Pain.      oseltamivir 75 MG Oral Cap       CETIRIZINE HCL OR Take by mouth as needed.      FLUTICASONE PROPIONATE 50 MCG/ACT Nasal Suspension SHAKE LIQUID AND USE 2 SPRAYS IN EACH NOSTRIL DAILY 16 g 0    chlorhexidine gluconate 0.12 % Mouth/Throat Solution Use as directed 15 mL in the mouth or throat 2 (two) times daily. 473 mL 0    Ferrous Sulfate Dried ER (SLOW RELEASE IRON) 160 (50 Fe) MG Oral Tab CR Take 160 mg by mouth  daily. 90 tablet 1    Ferrous Sulfate 324 (65 Fe) MG Oral Tab EC Take 1 tablet by mouth 2 (two) times daily. AT BEDTIME      albuterol 108 (90 Base) MCG/ACT Inhalation Aero Soln Inhale 2 puffs into the lungs every 6 (six) hours as needed for Wheezing or Shortness of Breath. 2 each 2    ERGOCALCIFEROL 1.25 MG (92478 UT) Oral Cap TAKE 1 CAPSULE BY MOUTH 1 TIME A WEEK FOR 12 DOSES 12 capsule 0    Budesonide-Formoterol Fumarate 160-4.5 MCG/ACT Inhalation Aerosol Inhale 2 puffs into the lungs 2 (two) times daily. 30.6 g 0    Meloxicam 15 MG Oral Tab Take 1 tablet (15 mg total) by mouth daily. 30 tablet 0    Oxymetazoline HCl (NASAL SPRAY NA) by Nasal route as needed.      Multiple Vitamin (MULTI-VITAMIN DAILY) Oral Tab Take by mouth.        Past Medical History:    Asthma (HCC)    COVID    Runny nose, asthma symptoms. No hospitalization    Hx of motion sickness     No past surgical history on file.     Social History:  Social History     Socioeconomic History    Marital status:    Tobacco Use    Smoking status: Never    Smokeless tobacco: Never   Vaping Use    Vaping status: Never Used   Substance and Sexual Activity    Alcohol use: No    Drug use: No    Sexual activity: Yes        Family History   Problem Relation Age of Onset    Cancer Mother     Other (lymph nodes) Mother         Cancer    Cancer Maternal Grandfather     Diabetes Paternal Grandfather      Patient Active Problem List   Diagnosis    Macromastia    High serum parathyroid hormone (PTH)    Cyst of ovary    Endometrial thickening on ultrasound    Hypercalcemia    Mild asthma without complication (HCC)    Allergic rhinitis    Urinary symptom or sign    Abdominal cramps    Elevated parathyroid hormone    Vitamin D deficiency    S/P bilateral breast reduction    Iron deficiency    Hyperparathyroidism (HCC)    Abdominal pain    Shoulder pain    Oral thrush    Influenza    Obesity    Trying to get pregnant    Varicose veins    Pain in both lower  extremities    Need for vaccination    Sleep concern    Snoring    Hair loss    Pain in both knees    Jaw pain    TMJ (temporomandibular joint syndrome)               REVIEW OF SYSTEMS:   A comprehensive 10 point review of systems was completed.  Pertinent positives and negatives noted in the the HPI          PHYSICAL EXAM:   /84   Pulse 85   Ht 5' 4.17\" (1.63 m)   Wt 199 lb 3.2 oz (90.4 kg)   LMP 07/22/2024 (Exact Date)   SpO2 100%   BMI 34.01 kg/m²   GENERAL: well developed, well nourished,in no apparent distress  LUNGS: clear to auscultation  CARDIO: RRR without murmur  NEURO: no gross deficits              Orders Placed This Encounter    letrozole 2.5 MG Oral Tab     Sig: Take 1 tablet (2.5 mg total) by mouth daily.    ibuprofen 800 MG Oral Tab     Sig: Take 1 tablet (800 mg total) by mouth every 6 (six) hours as needed for Pain.         ASSESSMENT/PLAN:       ICD-10-CM    1. Iron deficiency  E61.1 letrozole 2.5 MG Oral Tab     ibuprofen 800 MG Oral Tab     metFORMIN  MG Oral Tablet 24 Hr     Ferritin     Iron And Tibc     Hemoglobin A1C (Glycohemoglobin) [E]     Lipid Panel [E]     Fluzone trivalent vaccine, PF 0.5mL, 6mo+ (10321)      2. Vitamin D deficiency  E55.9 letrozole 2.5 MG Oral Tab     ibuprofen 800 MG Oral Tab     metFORMIN  MG Oral Tablet 24 Hr     Ferritin     Iron And Tibc     Hemoglobin A1C (Glycohemoglobin) [E]     Lipid Panel [E]     Fluzone trivalent vaccine, PF 0.5mL, 6mo+ (84029)      3. Trying to get pregnant  Z78.9 letrozole 2.5 MG Oral Tab     ibuprofen 800 MG Oral Tab     metFORMIN  MG Oral Tablet 24 Hr     Ferritin     Iron And Tibc     Hemoglobin A1C (Glycohemoglobin) [E]     Lipid Panel [E]     Fluzone trivalent vaccine, PF 0.5mL, 6mo+ (93326)      4. Obesity (BMI 30-39.9)  E66.9 letrozole 2.5 MG Oral Tab     ibuprofen 800 MG Oral Tab     metFORMIN  MG Oral Tablet 24 Hr     Ferritin     Iron And Tibc     Hemoglobin A1C (Glycohemoglobin) [E]      Lipid Panel [E]     Fluzone trivalent vaccine, PF 0.5mL, 6mo+ (88493)         Plan:  Patient has lost 3 lbs # since LOV. Patient has lost a total weight loss of 9 lbs # since first weight loss consult.  Labs reviewed  Advised to continue with low carb diet   Goal is  g per day   Advised to read nutrition labels  Log in carbs if possible   Increase protein intake   exercise goal is 1 hour 3 times per week cardio and strength training   discussed challenges with weight loss   Weigh once a week at least   Avoid sugary drinks or artificially sweetened drinks  Water intake goal is 64 oz per day   Goal weight loss is 9 lbs in 3 months   Advised to check with OB gyne if ok to use metformin   Start with 1 tab daily x 2 weeks if tolerating well increase to bid   Flu shot today   Blood work when fasting           Plan:  Nutrition: low carb diet   Referral RD/nutritionist :n  Behavior:  Motivational interviewing performed      Discussed strategies to overcome habits/challenges       Reviewed:  Nutrition and the importance of regular protein intake  Labs ordered:   yes   Treatment plan       I spent 30 minutes at the day of the service seeing the patient, examination, reviewing labs, independently interpreting results, completing charting and counseling the patient and/or on coordination of care.  The diagnosis, prognosis, and general treatment was explained to the patient.     Please return to the clinic if you are having persistent or worsening symptoms   Kishan Plunkett MD,   Diplomate of the American Board of Internal Medicine  Diplomate of the American Board of Obesity Medicine

## 2025-01-16 NOTE — PATIENT INSTRUCTIONS
Surgeon:         Dr. Martha Abel                                        Tel:         [phone]                                  Fax:        [phone]    Surgery/Procedure:  Bilateral breast reduction.  General anesthesia, outpatient, 5 hours
68

## 2025-01-22 ENCOUNTER — LAB ENCOUNTER (OUTPATIENT)
Dept: LAB | Facility: HOSPITAL | Age: 40
End: 2025-01-22
Attending: INTERNAL MEDICINE
Payer: MEDICAID

## 2025-01-22 ENCOUNTER — TELEPHONE (OUTPATIENT)
Dept: INTERNAL MEDICINE CLINIC | Facility: CLINIC | Age: 40
End: 2025-01-22

## 2025-01-22 DIAGNOSIS — E55.9 VITAMIN D DEFICIENCY: ICD-10-CM

## 2025-01-22 DIAGNOSIS — N92.6 MISSED PERIOD: Primary | ICD-10-CM

## 2025-01-22 DIAGNOSIS — Z78.9 TRYING TO GET PREGNANT: ICD-10-CM

## 2025-01-22 DIAGNOSIS — E66.9 OBESITY (BMI 30-39.9): ICD-10-CM

## 2025-01-22 DIAGNOSIS — E61.1 IRON DEFICIENCY: ICD-10-CM

## 2025-01-22 DIAGNOSIS — N92.6 MISSED PERIOD: ICD-10-CM

## 2025-01-22 LAB
B-HCG SERPL-ACNC: 1856.3 MIU/ML
CHOLEST SERPL-MCNC: 143 MG/DL (ref ?–200)
DEPRECATED HBV CORE AB SER IA-ACNC: 73 NG/ML
EST. AVERAGE GLUCOSE BLD GHB EST-MCNC: 100 MG/DL (ref 68–126)
FASTING PATIENT LIPID ANSWER: YES
HBA1C MFR BLD: 5.1 % (ref ?–5.7)
HDLC SERPL-MCNC: 58 MG/DL (ref 40–59)
IRON SATN MFR SERPL: 7 %
IRON SERPL-MCNC: 23 UG/DL
LDLC SERPL CALC-MCNC: 74 MG/DL (ref ?–100)
NONHDLC SERPL-MCNC: 85 MG/DL (ref ?–130)
TIBC SERPL-MCNC: 340 UG/DL (ref 250–425)
TRANSFERRIN SERPL-MCNC: 228 MG/DL (ref 250–380)
TRIGL SERPL-MCNC: 51 MG/DL (ref 30–149)
VLDLC SERPL CALC-MCNC: 8 MG/DL (ref 0–30)

## 2025-01-22 PROCEDURE — 84466 ASSAY OF TRANSFERRIN: CPT

## 2025-01-22 PROCEDURE — 84702 CHORIONIC GONADOTROPIN TEST: CPT

## 2025-01-22 PROCEDURE — 36415 COLL VENOUS BLD VENIPUNCTURE: CPT

## 2025-01-22 PROCEDURE — 83036 HEMOGLOBIN GLYCOSYLATED A1C: CPT

## 2025-01-22 PROCEDURE — 83540 ASSAY OF IRON: CPT

## 2025-01-22 PROCEDURE — 82728 ASSAY OF FERRITIN: CPT

## 2025-01-22 PROCEDURE — 80061 LIPID PANEL: CPT

## 2025-01-22 NOTE — TELEPHONE ENCOUNTER
Pt asked for an order for a lab test for pregnancy. She said she has missed periods and wants to check before doing any imaging that was ordered for her

## 2025-01-23 DIAGNOSIS — N92.6 MISSED PERIOD: ICD-10-CM

## 2025-01-23 DIAGNOSIS — Z3A.01 LESS THAN 8 WEEKS GESTATION OF PREGNANCY (HCC): Primary | ICD-10-CM

## 2025-01-24 ENCOUNTER — LAB ENCOUNTER (OUTPATIENT)
Dept: LAB | Facility: HOSPITAL | Age: 40
End: 2025-01-24
Attending: INTERNAL MEDICINE
Payer: MEDICAID

## 2025-01-24 DIAGNOSIS — N92.6 MISSED PERIOD: ICD-10-CM

## 2025-01-24 DIAGNOSIS — Z3A.01 LESS THAN 8 WEEKS GESTATION OF PREGNANCY (HCC): ICD-10-CM

## 2025-01-24 LAB — B-HCG SERPL-ACNC: 3689.6 MIU/ML

## 2025-01-24 PROCEDURE — 84702 CHORIONIC GONADOTROPIN TEST: CPT

## 2025-01-24 PROCEDURE — 36415 COLL VENOUS BLD VENIPUNCTURE: CPT

## 2025-06-27 ENCOUNTER — LAB ENCOUNTER (OUTPATIENT)
Dept: LAB | Facility: REFERENCE LAB | Age: 40
End: 2025-06-27
Attending: OBSTETRICS & GYNECOLOGY
Payer: MEDICAID

## 2025-06-27 DIAGNOSIS — O99.810: Primary | ICD-10-CM

## 2025-06-27 LAB
GLUCOSE 1H P GLC SERPL-MCNC: 162 MG/DL (ref 70–179)
GLUCOSE 2H P GLC SERPL-MCNC: 154 MG/DL (ref 70–154)
GLUCOSE 3H P GLC SERPL-MCNC: 115 MG/DL (ref 70–140)
GLUCOSE P FAST SERPL-MCNC: 94 MG/DL (ref 70–94)

## 2025-06-27 PROCEDURE — 82952 GTT-ADDED SAMPLES: CPT

## 2025-06-27 PROCEDURE — 82951 GLUCOSE TOLERANCE TEST (GTT): CPT

## 2025-06-27 PROCEDURE — 36415 COLL VENOUS BLD VENIPUNCTURE: CPT

## 2025-07-19 ENCOUNTER — HOSPITAL ENCOUNTER (EMERGENCY)
Facility: HOSPITAL | Age: 40
Discharge: HOME | End: 2025-07-19
Attending: STUDENT IN AN ORGANIZED HEALTH CARE EDUCATION/TRAINING PROGRAM
Payer: MEDICAID

## 2025-07-19 ENCOUNTER — APPOINTMENT (OUTPATIENT)
Dept: ULTRASOUND IMAGING | Facility: HOSPITAL | Age: 40
End: 2025-07-19
Attending: STUDENT IN AN ORGANIZED HEALTH CARE EDUCATION/TRAINING PROGRAM
Payer: MEDICAID

## 2025-07-19 VITALS
SYSTOLIC BLOOD PRESSURE: 105 MMHG | OXYGEN SATURATION: 100 % | DIASTOLIC BLOOD PRESSURE: 67 MMHG | WEIGHT: 235 LBS | TEMPERATURE: 98 F | HEIGHT: 64 IN | RESPIRATION RATE: 16 BRPM | HEART RATE: 98 BPM | BODY MASS INDEX: 40.12 KG/M2

## 2025-07-19 DIAGNOSIS — M79.89 LEFT LEG SWELLING: Primary | ICD-10-CM

## 2025-07-19 PROCEDURE — 99284 EMERGENCY DEPT VISIT MOD MDM: CPT

## 2025-07-19 PROCEDURE — 93971 EXTREMITY STUDY: CPT | Performed by: STUDENT IN AN ORGANIZED HEALTH CARE EDUCATION/TRAINING PROGRAM

## 2025-07-19 NOTE — ED INITIAL ASSESSMENT (HPI)
Pt presents to ED to R/U DVT, sent from [facility]. Pt is 30wks pregnant. Pt reports L foot and calf pain x 3 weeks, worse the past 24 hours.

## 2025-07-20 NOTE — ED NOTES
Pt alert, oriented x4.  Pt from home with  via private vehicle.  Arrived to ED room 28.  Chief complaint is left leg swelling and pain.    Pt states she has has left foot pain x1 month.  Intermittent.  About 30min -1 hour ago, pain started radiating up medial/posterior left calf.  Pt reports both legs have been intermittently swollen over past month, but only left leg is edematous today.  OB had pt start taking a daily 81mg aspirin - first dose was yesterday.  Today, pt took the asa around 15:00.    Foot pain described as \"burning\", calf pain described as feeling like a muscle spasm/tight.  No areas of redness, no areas of hot/unusual warmth.    Pt is , last pregnancy was 11 years ago.  No complications with that pregnancy, vaginal delivery at full term.  Current pregnancy also described as uncomplicated, other than typical symptoms like fatigue.  Mom reports baby moving well.  Last OB appt was last Wednesday,  FHT audible and good.  AKASH is 25.  Plans to deliver at [place].  Dr is Jordy Eden MD.  OB team is aware of her symptoms and that she is in ED.    Pt declined to change into gown and attached to NIBP and SpO2 monitors.  Warm blanket given.  Call light within reach.  Cart low, in locked position.

## 2025-07-20 NOTE — ED PROVIDER NOTES
[place] Emergency Department Note  Patient: Leilani Jackson Age: 40 year old Sex: female      MRN: [id number]  : 1985    Patient Seen in: [facility] Emergency Department    History     Chief Complaint   Patient presents with    Leg Pain     Stated Complaint: RO DVT    History obtained from: Patient    Patient is a 40-year-old female with past medical history of asthma, currently 30 weeks gestational age presenting today for evaluation of left foot swelling and pain.  She states that she is been having a burning pain to the bottom of her left foot over the past 3 weeks.  Is worse with weightbearing.  States the pain radiates up her left calf.  She states that she feels of her left foot and ankle are swollen compared to the right.  She states that she has been elevating her legs with no improvement.  She denies any history of elevated blood pressures during pregnancy.  Denies any right upper quadrant abdominal pain.  Denies any confusion or mental status changes    Review of Systems:  Review of Systems  Positive for stated complaint: RO DVT. Constitutional and vital signs reviewed. All other systems reviewed and negative except as noted above.    Patient History:  Past Medical History[1]    Past Surgical History[2]     Family History[3]    Specific Social Determinants of Health:   Short Social Hx on File[4]        PSFH elements reviewed from today and agreed except as otherwise stated in HPI.    Physical Exam     ED Triage Vitals [25 1856]   /83   Pulse 102   Resp 20   Temp 98.2 °F (36.8 °C)   Temp src Temporal   SpO2 98 %   O2 Device None (Room air)       Current:/67   Pulse 98   Temp 98.2 °F (36.8 °C) (Temporal)   Resp 16   Ht 162.6 cm (5' 4\")   Wt 106.6 kg   LMP 2024 (Exact Date)   SpO2 100%   BMI 40.34 kg/m²         Physical Exam  Constitutional:       General: She is not in acute distress.  HENT:      Head: Normocephalic and atraumatic.      Mouth/Throat:      Mouth:  Mucous membranes are moist.   Eyes:      Extraocular Movements: Extraocular movements intact.   Cardiovascular:      Rate and Rhythm: Normal rate and regular rhythm.      Heart sounds: Normal heart sounds.   Pulmonary:      Effort: Pulmonary effort is normal. No respiratory distress.      Breath sounds: Normal breath sounds.   Abdominal:      Palpations: Abdomen is soft.      Tenderness: There is no abdominal tenderness.   Musculoskeletal:      Comments: Subtle bilateral lower extremity ankle swelling, left minimally worse than the right over the medial ankle.  No overlying erythema or warmth to touch.  Normal range of motion.  No calf tenderness.   Skin:     General: Skin is warm and dry.      Capillary Refill: Capillary refill takes less than 2 seconds.      Findings: No rash.   Neurological:      General: No focal deficit present.      Mental Status: She is alert and oriented to person, place, and time.   Psychiatric:         Mood and Affect: Mood normal.         Behavior: Behavior normal.         ED Course   Labs:   Labs Reviewed - No data to display  Radiology findings:  I personally reviewed the images.   US VENOUS DOPPLER LEG LEFT - DIAG IMG (CPT=93971)  Result Date: 7/19/2025  Workstation: [id number]        Cardiac Monitor: Interpreted by me.   Pulse Readings from Last 1 Encounters:   07/19/25 98   , sinus,     External non-ED records reviewed independently by me: GYN note from 6/18/2025 confirming patient is G2, P1, pregnancy complicated by AMA and fibroid uterus    MDM   40-year-old G2, P1 at approximately 30 weeks gestational age with history of asthma presenting today for evaluation of atraumatic left foot pain and swelling.  On exam, she is well-appearing and in no acute distress.  Vitals within normal limits.  She has no reproducible bony tenderness.  No overlying skin changes.  No redness or warmth to touch.  DP and PT pulse are 2+ and equal bilaterally.  She has very subtle edema to bilateral feet  left greater than right most notable at the medial ankle.    Differential diagnoses considered includes, but is not limited to: Edema, edema pregnancy, DVT, Baker's cyst    Will obtain the following tests: Left lower extremity venous  Please see ED course for my independent review of these tests/imaging results.    Initial Medications/Therapeutics administered: None    Chronic conditions affecting care: Asthma    Workup and medications considered but not ordered: None    Social Determinants of Health that impacted care: None    ED Course as of 07/19/25 2153  ------------------------------------------------------------  Time: 07/19 2130  Comment: Independently viewed the left lower extremity venous duplex that shows no evidence of DVT.  Agree with radiology read above.  Patient made aware of unremarkable findings.  Discussed compression stockings and leg elevation for lymphedema.  Return precautions were discussed and all questions answered.  Patient expressed understanding and agreement with plan.          Disposition and Plan     Clinical Impression:  1. Left leg swelling        Disposition:  Discharge    Follow-up:  Kishan Plunkett MD  [address] [place] [address], [address]  [place] [address]  [phone]    Schedule an appointment as soon as possible for a visit in 2 day(s)  As needed, If symptoms worsen      Medications Prescribed:  Discharge Medication List as of 7/19/2025  9:33 PM            This note may have been created using voice dictation technology and may include inadvertent errors.      Suzanne Matthews MD  Emergency Medicine             [1]   Past Medical History:   Asthma (HCC)    COVID    Runny nose, asthma symptoms. No hospitalization    Hx of motion sickness   [2] No past surgical history on file.  [3]   Family History  Problem Relation Age of Onset    Cancer Mother     Other (lymph nodes) Mother         Cancer    Cancer Maternal Grandfather     Diabetes Paternal Grandfather     [4]   Social History  Socioeconomic History    Marital status:    Tobacco Use    Smoking status: Never    Smokeless tobacco: Never   Vaping Use    Vaping status: Never Used   Substance and Sexual Activity    Alcohol use: No    Drug use: No    Sexual activity: Yes

## 2025-08-05 ENCOUNTER — TELEPHONE (OUTPATIENT)
Dept: PERINATAL CARE | Facility: HOSPITAL | Age: 40
End: 2025-08-05

## 2025-08-12 ENCOUNTER — HOSPITAL ENCOUNTER (OUTPATIENT)
Dept: PERINATAL CARE | Facility: HOSPITAL | Age: 40
Discharge: HOME | End: 2025-08-12
Attending: OBSTETRICS & GYNECOLOGY

## 2025-08-12 VITALS
WEIGHT: 237 LBS | DIASTOLIC BLOOD PRESSURE: 66 MMHG | BODY MASS INDEX: 41 KG/M2 | HEART RATE: 100 BPM | SYSTOLIC BLOOD PRESSURE: 104 MMHG

## 2025-08-12 DIAGNOSIS — O99.210 OBESITY IN PREGNANCY (HCC): ICD-10-CM

## 2025-08-12 DIAGNOSIS — Z36.89 ENCOUNTER FOR FETAL ANATOMIC SURVEY (HCC): ICD-10-CM

## 2025-08-12 DIAGNOSIS — O09.523 AMA (ADVANCED MATERNAL AGE) MULTIGRAVIDA 35+, THIRD TRIMESTER (HCC): ICD-10-CM

## 2025-08-12 DIAGNOSIS — Z36.89 ENCOUNTER FOR FETAL ANATOMIC SURVEY (HCC): Primary | ICD-10-CM

## 2025-08-12 PROCEDURE — 76819 FETAL BIOPHYS PROFIL W/O NST: CPT

## 2025-08-12 PROCEDURE — 76811 OB US DETAILED SNGL FETUS: CPT | Performed by: OBSTETRICS & GYNECOLOGY

## (undated) DIAGNOSIS — E55.9 VITAMIN D DEFICIENCY: ICD-10-CM

## (undated) DEVICE — SUTURE SILK 2-0 SH

## (undated) DEVICE — EXOFIN TISSUE ADHESIVE 1.0ML

## (undated) DEVICE — PLASTIC BREAST CDS-LF: Brand: MEDLINE INDUSTRIES, INC.

## (undated) DEVICE — SUTURE MONOCRYL 4-0 PS-2

## (undated) DEVICE — 3M™ STERI-STRIP™ REINFORCED ADHESIVE SKIN CLOSURES, R1547, 1/2 IN X 4 IN (12 MM X 100 MM), 6 STRIPS/ENVELOPE: Brand: 3M™ STERI-STRIP™

## (undated) DEVICE — 3M™ IOBAN™ 2 ANTIMICROBIAL INCISE DRAPE 6648EZ: Brand: IOBAN™ 2

## (undated) DEVICE — SUTURE CHROMIC GUT 5-0 RB-1

## (undated) DEVICE — MEGADYNE E-Z CLEAN BLADE 2.75"

## (undated) DEVICE — HEMOCLIP HORIZON MED 002200

## (undated) DEVICE — SOL  .9 1000ML BTL

## (undated) DEVICE — #15 STERILE STAINLESS BLADE: Brand: STERILE STAINLESS BLADES

## (undated) DEVICE — LAPAROTOMY SPONGE - RF AND X-RAY DETECTABLE PRE-WASHED: Brand: SITUATE

## (undated) DEVICE — LIGACLIP MCA MULTIPLE CLIP APPLIERS, 30 MEDIUM CLIPS: Brand: LIGACLIP

## (undated) DEVICE — BRA SURGICAL ELIZABETH PINK L

## (undated) DEVICE — SUTURE VICRYL 2-0 SH

## (undated) DEVICE — 40580 - THE PINK PAD - ADVANCED TRENDELENBURG POSITIONING KIT: Brand: 40580 - THE PINK PAD - ADVANCED TRENDELENBURG POSITIONING KIT

## (undated) DEVICE — LAMINECTOMY ARM CRADLE FOAM POSITIONER: Brand: CARDINAL HEALTH

## (undated) DEVICE — SCD SLEEVE KNEE HI BLEND

## (undated) DEVICE — BANDAGE ROLL,100% COTTON, 6 PLY, LARGE: Brand: KERLIX

## (undated) DEVICE — SUPER SPONGES,MEDIUM: Brand: KERLIX

## (undated) DEVICE — SPECIMEN CONTAINER,POSITIVE SEAL INDICATOR, OR PACKAGED: Brand: PRECISION

## (undated) DEVICE — 3M™ STERI-STRIP™ REINFORCED ADHESIVE SKIN CLOSURES, R1548, 1 IN X 5 IN (25 MM X 125 MM), 4 STRIPS/ENVELOPE: Brand: 3M™ STERI-STRIP™

## (undated) DEVICE — OCCLUSIVE GAUZE STRIP OVERWRAP,3% BISMUTH TRIBROMOPHENATE IN PETROLATUM BLEND: Brand: XEROFORM

## (undated) DEVICE — STERILE POLYISOPRENE POWDER-FREE SURGICAL GLOVES: Brand: PROTEXIS

## (undated) DEVICE — #10 STERILE BLADE: Brand: POLYMER COATED BLADES

## (undated) DEVICE — PROXIMATE SKIN STAPLERS (35 WIDE) CONTAINS 35 STAINLESS STEEL STAPLES (FIXED HEAD): Brand: PROXIMATE

## (undated) DEVICE — Device

## (undated) DEVICE — MARKER SKIN PREP RESIST STRL

## (undated) DEVICE — SUTURE VICRYL 3-0 SH

## (undated) DEVICE — 1010 S-DRAPE TOWEL DRAPE 10/BX: Brand: STERI-DRAPE™

## (undated) NOTE — LETTER
ASTHMA ACTION PLAN for [facility]     : 1985     Date: 20  Doctor:  Ace Andrea MD  Phone for doctor or clinic: Nida Colmenares.  [address] [facility] [address]  [phone]           ACT Wake up at night Medicine How much to take When to take it    If symptoms are not improving in 24-48 hrs, call office for further instructions  Medications     Leukotriene Modulators Instructions     MONTELUKAST SODIUM 10 MG Oral Tab    TAKE 1 TABLET(10 MG Karolyn Jackson Caretaker

## (undated) NOTE — ED AVS SNAPSHOT
Bala Gaw   MRN: [id number]    Department:  [facility] Emergency Department   Date of Visit:  11/16/2019           Disclosure     Insurance plans vary and the physician(s) referred by the ER may not be covered by your plan.  Please contact you CARE PHYSICIAN AT ONCE OR RETURN IMMEDIATELY TO THE EMERGENCY DEPARTMENT. If you have been prescribed any medication(s), please fill your prescription right away and begin taking the medication(s) as directed.   If you believe that any of the medications

## (undated) NOTE — Clinical Note
Thank you for the consult. I saw Ms. Caryle Adas in the endocrine/diabetes clinic today. Please see attached my note. Please feel free to contact me with any questions. Thanks!

## (undated) NOTE — MR AVS SNAPSHOT
After Visit Summary   9/22/2020    Sheree Casas    MRN: [id number]           Visit Information     Date & Time  9/22/2020  3:00 PM Provider  Alisia Beckford MD [address] [place] [address], [address] [place] [address],[address], [facility].  Phone  [phone] [facility] now offers Video Visits through [address] for adult and pediatric patients. Video Visits are available Monday - Friday for many common conditions such as allergies, colds, cough, fever, rash, sore throat, headache and pink eye.   The cost for a Video Vi [address]   Monday – Friday  4:00 pm – 10:00 pm   Saturday – Sunday  10:00 am – 4:00 pm  WALK-IN CARE  Emergency Medicine Providers  Conditions needing urgent attention, but are   non-life-threatening.     Also available by appointment Average cost  $120*

## (undated) NOTE — LETTER
4/3/2020              Karolyn Jackson        [address] [place] [address] [place] [address]         Dear Corina Espitia records indicate that the tests ordered for you by Noemi Payne MD  have not been done.   If you have, in fact, already completed

## (undated) NOTE — LETTER
ASTHMA ACTION PLAN for [facility]     : 1985     Date: 22  Doctor:  John Weiss MD  Phone for doctor or clinic: [address] [facility] [address] [phone]           ACT Goal Peak Flow  Continue Controller Medications But ADD:   Medicine not helping  Breathing is hard and fast  Nose opens wide  Can't walk  Ribs show  Can't talk well Medicine How much to take When to take it    If your symptoms do not improve in ONE hour -  go t